# Patient Record
Sex: MALE | Race: WHITE | NOT HISPANIC OR LATINO | Employment: UNEMPLOYED | URBAN - NONMETROPOLITAN AREA
[De-identification: names, ages, dates, MRNs, and addresses within clinical notes are randomized per-mention and may not be internally consistent; named-entity substitution may affect disease eponyms.]

---

## 2018-02-22 ENCOUNTER — HOSPITAL ENCOUNTER (OUTPATIENT)
Dept: MRI IMAGING | Facility: HOSPITAL | Age: 43
Discharge: HOME | End: 2018-02-22
Attending: FAMILY MEDICINE | Admitting: FAMILY MEDICINE
Payer: COMMERCIAL
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
## 2018-02-22 DIAGNOSIS — M25.512 LEFT SHOULDER PAIN: ICD-10-CM

## 2018-02-22 PROCEDURE — 73221 MRI JOINT UPR EXTREM W/O DYE: CPT | Mod: TC,LT

## 2018-02-28 ENCOUNTER — TRANSFERRED RECORDS (OUTPATIENT)
Dept: HEALTH INFORMATION MANAGEMENT | Facility: HOSPITAL | Age: 43
End: 2018-02-28

## 2018-03-19 ENCOUNTER — HOSPITAL ENCOUNTER (EMERGENCY)
Facility: HOSPITAL | Age: 43
Discharge: HOME | End: 2018-03-19
Attending: NURSE PRACTITIONER | Admitting: NURSE PRACTITIONER
Payer: COMMERCIAL

## 2018-03-19 ENCOUNTER — APPOINTMENT (OUTPATIENT)
Dept: GENERAL RADIOLOGY | Facility: HOSPITAL | Age: 43
End: 2018-03-19
Attending: NURSE PRACTITIONER
Payer: COMMERCIAL

## 2018-03-19 VITALS
BODY MASS INDEX: 32.58 KG/M2 | HEART RATE: 94 BPM | HEIGHT: 69 IN | RESPIRATION RATE: 18 BRPM | WEIGHT: 220 LBS | DIASTOLIC BLOOD PRESSURE: 79 MMHG | TEMPERATURE: 97.9 F | SYSTOLIC BLOOD PRESSURE: 128 MMHG | OXYGEN SATURATION: 97 %

## 2018-03-19 DIAGNOSIS — S93.402A SPRAIN OF LEFT ANKLE, UNSPECIFIED LIGAMENT, INITIAL ENCOUNTER: ICD-10-CM

## 2018-03-19 PROCEDURE — 99202 OFFICE O/P NEW SF 15 MIN: CPT | Performed by: NURSE PRACTITIONER

## 2018-03-19 PROCEDURE — G0463 HOSPITAL OUTPT CLINIC VISIT: HCPCS

## 2018-03-19 PROCEDURE — 73610 X-RAY EXAM OF ANKLE: CPT | Mod: TC,LT

## 2018-03-19 RX ORDER — PROPRANOLOL HYDROCHLORIDE 10 MG/1
10 TABLET ORAL 2 TIMES DAILY
COMMUNITY
End: 2018-09-24

## 2018-03-19 ASSESSMENT — ENCOUNTER SYMPTOMS
ACTIVITY CHANGE: 1
PSYCHIATRIC NEGATIVE: 1
APPETITE CHANGE: 0
FEVER: 0
WOUND: 0
DYSURIA: 0
COUGH: 0
WEAKNESS: 0
TROUBLE SWALLOWING: 0
CHILLS: 0
NUMBNESS: 1

## 2018-03-19 NOTE — ED AVS SNAPSHOT
[facility]    [address] [place] [address]    Phone:  [phone]                                       Dick Valdovinos   MRN: [id number]    Department:  [facility]   Date of Visit:  3/19/2018           After Visit Summary Signature Page     I have received my discharge instructions, and my questions have been answered. I have discussed any challenges I see with this plan with the nurse or doctor.    ..........................................................................................................................................  Patient/Patient Representative Signature      ..........................................................................................................................................  Patient Representative Print Name and Relationship to Patient    ..................................................               ................................................  Date                                            Time    ..........................................................................................................................................  Reviewed by Signature/Title    ...................................................              ..............................................  Date                                                            Time

## 2018-03-19 NOTE — ED AVS SNAPSHOT
[facility]    [address] [address]    Phone:  [phone]                                       Dick Valdovinos   MRN: [id number]    Department:  [facility]   Date of Visit:  3/19/2018           Patient Information     Date Of Birth          1975        Your diagnoses for this visit were:     Sprain of left ankle, unspecified ligament, initial encounter        You were seen by Jeni Etienne NP.      Follow-up Information     Follow up with Stanley Amador MD In 10 days.    Specialty:  Family Practice    Why:  For re-evaluation.     Contact information:    [facility]  [address] [address] [phone]          Follow up with [facility].    Specialty:  EMERGENCY MEDICINE    Why:  As needed, If symptoms worsen    Contact information:    [address] [address] [phone]    Additional information:    From [facility]: Take [address]. Turn left at [address]. Turn left at the first stoplight on [address]. At the first stop sign, take a right onto [address]. Take a left into the parking lot and continue through until you reach the North enterance of the building.       From [place]: Take [address]. Take the [address] ramp towards [place]. Turn left onto [address]. Take a slight right onto [address]. Turn left at the first stoplight on [address]. At the first stop sign, take a right onto [address]. Take a left into the parking lot and continue through until you reach the North enterance of the building.       From [place]: Take [address]. Take a right at [address]. At the first stop sign, take a right onto [address]. Take a left into the parking lot and continue through until you reach the North enterance of the building.         Discharge Instructions       Take tylenol and/or ibuprofen for  "pain. Follow dosing on package.   See RICE handout.   Elevate left foot as much as able.   Apply ice to left ankle for 20 minutes every 1-2 hours while awake.   Wear boot to left ankle until pain improves. You can take boot off for rest.   Follow up with PCP in 10 days.   Return to urgent care or emergency department with any increase in symptoms or concerns.         Discharge References/Attachments     SPRAIN, ANKLE (ADULT) (ENGLISH)    SPRAINS AND FRACTURES: FIRST AID (ENGLISH)    STRAINS AND SPRAINS, SELF-CARE FOR (ENGLISH)    STRAINS AND SPRAINS, TREATING (ENGLISH)    RICE (ENGLISH)         Review of your medicines      Our records show that you are taking the medicines listed below. If these are incorrect, please call your family doctor or clinic.        Dose / Directions Last dose taken    BUSPAR PO        Refills:  0        LAMICTAL PO   Dose:  100 mg        Take 100 mg by mouth 2 times daily   Refills:  0        propranolol 10 MG tablet   Commonly known as:  INDERAL        Take by mouth 3 times daily   Refills:  0                Procedures and tests performed during your visit     Ankle XR, G/E 3 views, left      Orders Needing Specimen Collection     None      Pending Results     No orders found from 3/17/2018 to 3/20/2018.            Pending Culture Results     No orders found from 3/17/2018 to 3/20/2018.            Thank you for choosing [facility]       Thank you for choosing [facility] for your care. Our goal is always to provide you with excellent care. Hearing back from our patients is one way we can continue to improve our services. Please take a few minutes to complete the written survey that you may receive in the mail after you visit with us. Thank you!        [email or web address] Information     [employer] lets you send messages to your doctor, view your test results, renew your prescriptions, schedule appointments and more. To sign up, go to [email or web address] . Click on \"Log in\" on the left side of the " "screen, which will take you to the Welcome page. Then click on \"Sign up Now\" on the right side of the page.     You will be asked to enter the access code listed below, as well as some personal information. Please follow the directions to create your username and password.     Your access code is: [email or web address]  Expires: 2018  2:43 PM     Your access code will  in 90 days. If you need help or a new code, please call your [facility] clinic or [phone].        Care EveryWhere ID     This is your Care EveryWhere ID. This could be used by other organizations to access your [facility] medical records  [id number]        Equal Access to Services     GEETHA FIGUEREDO : Reina Salcido, malik clinton, jarad gupta, niki solis. So [facility] [phone].    ATENCIÓN: Si habla español, tiene a baxter disposición servicios gratuitos de asistencia lingüística. Llame al [phone].    We comply with applicable federal civil rights laws and [place] laws. We do not discriminate on the basis of race, color, national origin, age, disability, sex, sexual orientation, or gender identity.            After Visit Summary       This is your record. Keep this with you and show to your community pharmacist(s) and doctor(s) at your next visit.                  "

## 2018-03-19 NOTE — DISCHARGE INSTRUCTIONS
Take tylenol and/or ibuprofen for pain. Follow dosing on package.   See RICE handout.   Elevate left foot as much as able.   Apply ice to left ankle for 20 minutes every 1-2 hours while awake.   Wear boot to left ankle until pain improves. You can take boot off for rest.   Follow up with PCP in 10 days.   Return to urgent care or emergency department with any increase in symptoms or concerns.

## 2018-03-19 NOTE — ED PROVIDER NOTES
"  History     Chief Complaint   Patient presents with     Ankle Trauma     Left ankle injury last night, CMS intact with strong palpable pulses     The history is provided by the patient. No  was used.     Dick Valdovinos is a 42 year old male who presents with left ankle pain that started 1 day ago. He was running to his car yesterday when he slipped on ice and fell. Denies hitting his head. Denies any other injuries. He's taken ibuprofen with mild effectiveness. Eating and drinking well. Bowel and bladder are working well. He is unable to bear weight on his left ankle. Previous left ankle sprain.       Problem List:    There are no active problems to display for this patient.       Past Medical History:    History reviewed. No pertinent past medical history.    Past Surgical History:    History reviewed. No pertinent surgical history.    Family History:    No family history on file.    Social History:  Marital Status:  Single [1]  Social History   Substance Use Topics     Smoking status: Light Tobacco Smoker     Smokeless tobacco: Never Used     Alcohol use Not on file        Medications:      BusPIRone HCl (BUSPAR PO)   propranolol (INDERAL) 10 MG tablet   LamoTRIgine (LAMICTAL PO)         Review of Systems   Constitutional: Positive for activity change. Negative for appetite change, chills and fever.   HENT: Negative for trouble swallowing.    Respiratory: Negative for cough.    Genitourinary: Negative for dysuria.   Musculoskeletal:        Left ankle pain.   Skin: Negative for rash and wound.   Neurological: Positive for numbness. Negative for weakness.        Some numbness and tingling in left ankle/foot.    Psychiatric/Behavioral: Negative.        Physical Exam   BP: 128/79  Pulse: 94  Temp: 97.9  F (36.6  C)  Resp: 18  Height: 175.3 cm (5' 9\")  Weight: 99.8 kg (220 lb)  SpO2: 97 %      Physical Exam   Constitutional: He is oriented to person, place, and time. He appears well-developed " and well-nourished. No distress.   HENT:   Head: Normocephalic.   Mouth/Throat: Oropharynx is clear and moist.   Neck: Normal range of motion. Neck supple.   Cardiovascular: Normal rate, regular rhythm, normal heart sounds and intact distal pulses.    No murmur heard.  Pulmonary/Chest: Effort normal. No respiratory distress. He has no wheezes. He has no rales.   Abdominal: Soft. He exhibits no distension.   Musculoskeletal: He exhibits edema and tenderness. He exhibits no deformity.        Feet:    CMS and ROM intact to left lower extremity. Limited ROM from pain. Left dorsalis pedis +2. Tenderness and swelling to left lateral malleolus.    Neurological: He is alert and oriented to person, place, and time. He exhibits normal muscle tone.   Skin: Skin is warm and dry. No rash noted. He is not diaphoretic. No erythema.   Psychiatric: He has a normal mood and affect. His behavior is normal.   Nursing note and vitals reviewed.      ED Course     ED Course     Procedures    I personally reviewed the x-rays and there is NO fracture or dislocation. Radiology review pending and nurse will notify patient if there is any change in the treatment plan.    Results for orders placed or performed during the hospital encounter of 03/19/18   Ankle XR, G/E 3 views, left    Narrative    PROCEDURE: XR ANKLE LT G/E 3 VW 3/19/2018 2:20 PM    HISTORY: Pain.;     COMPARISONS: None.    TECHNIQUE: 3 views.    FINDINGS: No fracture or dislocation is seen. Ankle mortise appears  congruent.    There is prominent lateral soft tissue swelling.         Impression    IMPRESSION: Prominent lateral soft tissue swelling.    INEZ LEVY MD     Deferred Toradol injection.     Assessments & Plan (with Medical Decision Making)     Discussed plan of care. He verbalized understanding. All questions answered.     I have reviewed the nursing notes.    I have reviewed the findings, diagnosis, plan and need for follow up with the patient.  Discharged in  stable condition.     Discharge Medication List as of 3/19/2018  2:44 PM          Final diagnoses:   Sprain of left ankle, unspecified ligament, initial encounter     Take tylenol and/or ibuprofen for pain. Follow dosing on package.   See RICE handout.   Elevate left foot as much as able.   Apply ice to left ankle for 20 minutes every 1-2 hours while awake.   Wear boot to left ankle until pain improves. You can take boot off for rest.   Follow up with PCP in 10 days.   Return to urgent care or emergency department with any increase in symptoms or concerns.     FERMIN Merino  3/19/2018  2:09 PM  [facility]       Jeni Etienne NP  03/19/18 [id number]

## 2018-04-11 ENCOUNTER — TRANSFERRED RECORDS (OUTPATIENT)
Dept: HEALTH INFORMATION MANAGEMENT | Facility: HOSPITAL | Age: 43
End: 2018-04-11

## 2018-04-13 RX ORDER — ALBUTEROL SULFATE 90 UG/1
2 AEROSOL, METERED RESPIRATORY (INHALATION) EVERY 4 HOURS PRN
COMMUNITY
End: 2021-06-20

## 2018-04-16 ENCOUNTER — ANESTHESIA EVENT (OUTPATIENT)
Dept: SURGERY | Facility: HOSPITAL | Age: 43
End: 2018-04-16
Payer: COMMERCIAL

## 2018-04-16 ENCOUNTER — HOSPITAL ENCOUNTER (OUTPATIENT)
Dept: MRI IMAGING | Facility: HOSPITAL | Age: 43
Discharge: HOME | End: 2018-04-16
Attending: FAMILY MEDICINE | Admitting: FAMILY MEDICINE
Payer: COMMERCIAL

## 2018-04-16 DIAGNOSIS — S49.91XA RIGHT SHOULDER INJURY: ICD-10-CM

## 2018-04-16 PROCEDURE — 73221 MRI JOINT UPR EXTREM W/O DYE: CPT | Mod: TC,RT

## 2018-04-16 ASSESSMENT — LIFESTYLE VARIABLES: TOBACCO_USE: 1

## 2018-04-16 NOTE — ANESTHESIA PREPROCEDURE EVALUATION
Anesthesia Evaluation     . Pt has had prior anesthetic.     No history of anesthetic complications          ROS/MED HX    ENT/Pulmonary:     (+)tobacco use, Current use <0.5 PPD packs/day  asthma , . .    Neurologic:  - neg neurologic ROS     Cardiovascular:  - neg cardiovascular ROS       METS/Exercise Tolerance:     Hematologic:  - neg hematologic  ROS       Musculoskeletal:   (+) , , other musculoskeletal- LEFT SHOULDER ROTATOR CUFF TEAR      GI/Hepatic:     (+) GERD       Renal/Genitourinary:  - ROS Renal section negative       Endo:     (+) Obesity, .      Psychiatric:     (+) psychiatric history depression      Infectious Disease:   (+) MRSA,       Malignancy:      - no malignancy   Other:    - neg other ROS                 Physical Exam  Normal systems: dental    Airway   Mallampati: I  TM distance: >3 FB  Neck ROM: full    Dental     Cardiovascular   Rhythm and rate: regular and normal      Pulmonary    breath sounds clear to auscultation                    Anesthesia Plan      History & Physical Review  History and physical reviewed and following examination; no interval change.    ASA Status:  2 .    NPO Status:  > 8 hours    Plan for General and ETT with Intravenous and Propofol induction. Maintenance will be Balanced.    PONV prophylaxis:  Ondansetron (or other 5HT-3), Scopolamine patch and Dexamethasone or Solumedrol  Patient is extremely nervous and refuses preop placement of block. Will consider in PACU after procedure if absolutely needed.       Postoperative Care  Postoperative pain management:  IV analgesics and Oral pain medications.      Consents  Anesthetic plan, risks, benefits and alternatives discussed with:  Patient..                          .

## 2018-04-17 ENCOUNTER — HOSPITAL ENCOUNTER (OUTPATIENT)
Facility: HOSPITAL | Age: 43
Discharge: HOME | End: 2018-04-17
Attending: ORTHOPAEDIC SURGERY | Admitting: ORTHOPAEDIC SURGERY
Payer: COMMERCIAL

## 2018-04-17 ENCOUNTER — ANESTHESIA (OUTPATIENT)
Dept: SURGERY | Facility: HOSPITAL | Age: 43
End: 2018-04-17
Payer: COMMERCIAL

## 2018-04-17 VITALS
WEIGHT: 201.4 LBS | OXYGEN SATURATION: 94 % | BODY MASS INDEX: 29.83 KG/M2 | RESPIRATION RATE: 18 BRPM | HEIGHT: 69 IN | TEMPERATURE: 97.7 F | SYSTOLIC BLOOD PRESSURE: 157 MMHG | DIASTOLIC BLOOD PRESSURE: 63 MMHG

## 2018-04-17 DIAGNOSIS — M75.122 COMPLETE TEAR OF LEFT ROTATOR CUFF: Primary | ICD-10-CM

## 2018-04-17 PROCEDURE — 25000128 H RX IP 250 OP 636: Performed by: ORTHOPAEDIC SURGERY

## 2018-04-17 PROCEDURE — 37000008 ZZH ANESTHESIA TECHNICAL FEE, 1ST 30 MIN: Performed by: ORTHOPAEDIC SURGERY

## 2018-04-17 PROCEDURE — C1713 ANCHOR/SCREW BN/BN,TIS/BN: HCPCS | Performed by: ORTHOPAEDIC SURGERY

## 2018-04-17 PROCEDURE — 36000093 ZZH SURGERY LEVEL 4 1ST 30 MIN: Performed by: ORTHOPAEDIC SURGERY

## 2018-04-17 PROCEDURE — 01999 UNLISTED ANES PROCEDURE: CPT | Performed by: NURSE ANESTHETIST, CERTIFIED REGISTERED

## 2018-04-17 PROCEDURE — 25000566 ZZH SEVOFLURANE, EA 15 MIN: Performed by: ANESTHESIOLOGY

## 2018-04-17 PROCEDURE — 40000306 ZZH STATISTIC PRE PROC ASSESS II: Performed by: ORTHOPAEDIC SURGERY

## 2018-04-17 PROCEDURE — 25000128 H RX IP 250 OP 636: Performed by: ANESTHESIOLOGY

## 2018-04-17 PROCEDURE — 27110028 ZZH OR GENERAL SUPPLY NON-STERILE: Performed by: ORTHOPAEDIC SURGERY

## 2018-04-17 PROCEDURE — 25000132 ZZH RX MED GY IP 250 OP 250 PS 637: Performed by: ANESTHESIOLOGY

## 2018-04-17 PROCEDURE — 71000027 ZZH RECOVERY PHASE 2 EACH 15 MINS: Performed by: ORTHOPAEDIC SURGERY

## 2018-04-17 PROCEDURE — 27210995 ZZH RX 272: Performed by: ORTHOPAEDIC SURGERY

## 2018-04-17 PROCEDURE — 37000009 ZZH ANESTHESIA TECHNICAL FEE, EACH ADDTL 15 MIN: Performed by: ORTHOPAEDIC SURGERY

## 2018-04-17 PROCEDURE — 25000125 ZZHC RX 250: Performed by: NURSE ANESTHETIST, CERTIFIED REGISTERED

## 2018-04-17 PROCEDURE — 25000128 H RX IP 250 OP 636: Performed by: NURSE ANESTHETIST, CERTIFIED REGISTERED

## 2018-04-17 PROCEDURE — 25000125 ZZHC RX 250

## 2018-04-17 PROCEDURE — 27210794 ZZH OR GENERAL SUPPLY STERILE: Performed by: ORTHOPAEDIC SURGERY

## 2018-04-17 PROCEDURE — 25000125 ZZHC RX 250: Performed by: ORTHOPAEDIC SURGERY

## 2018-04-17 PROCEDURE — 29827 SHO ARTHRS SRG RT8TR CUF RPR: CPT | Performed by: ANESTHESIOLOGY

## 2018-04-17 PROCEDURE — 71000014 ZZH RECOVERY PHASE 1 LEVEL 2 FIRST HR: Performed by: ORTHOPAEDIC SURGERY

## 2018-04-17 PROCEDURE — 36000063 ZZH SURGERY LEVEL 4 EA 15 ADDTL MIN: Performed by: ORTHOPAEDIC SURGERY

## 2018-04-17 DEVICE — ANCHOR-4.75MM BIOCOMPOSITE SWIVELOCK DOUBLE LOADED: Type: IMPLANTABLE DEVICE | Site: SHOULDER | Status: FUNCTIONAL

## 2018-04-17 RX ORDER — LABETALOL HYDROCHLORIDE 5 MG/ML
10 INJECTION, SOLUTION INTRAVENOUS
Status: DISCONTINUED | OUTPATIENT
Start: 2018-04-17 | End: 2018-04-17 | Stop reason: HOSPADM

## 2018-04-17 RX ORDER — BUPIVACAINE HYDROCHLORIDE AND EPINEPHRINE 5; 5 MG/ML; UG/ML
INJECTION, SOLUTION PERINEURAL
Status: DISCONTINUED
Start: 2018-04-17 | End: 2018-04-17 | Stop reason: HOSPADM

## 2018-04-17 RX ORDER — HYDRALAZINE HYDROCHLORIDE 20 MG/ML
2.5-5 INJECTION INTRAMUSCULAR; INTRAVENOUS EVERY 10 MIN PRN
Status: DISCONTINUED | OUTPATIENT
Start: 2018-04-17 | End: 2018-04-17 | Stop reason: HOSPADM

## 2018-04-17 RX ORDER — HYDROMORPHONE HYDROCHLORIDE 1 MG/ML
.3-.5 INJECTION, SOLUTION INTRAMUSCULAR; INTRAVENOUS; SUBCUTANEOUS EVERY 10 MIN PRN
Status: DISCONTINUED | OUTPATIENT
Start: 2018-04-17 | End: 2018-04-17 | Stop reason: HOSPADM

## 2018-04-17 RX ORDER — ALBUTEROL SULFATE 0.83 MG/ML
2.5 SOLUTION RESPIRATORY (INHALATION) EVERY 4 HOURS PRN
Status: DISCONTINUED | OUTPATIENT
Start: 2018-04-17 | End: 2018-04-17 | Stop reason: HOSPADM

## 2018-04-17 RX ORDER — CEFAZOLIN SODIUM 2 G/100ML
2 INJECTION, SOLUTION INTRAVENOUS
Status: COMPLETED | OUTPATIENT
Start: 2018-04-17 | End: 2018-04-17

## 2018-04-17 RX ORDER — PROPOFOL 10 MG/ML
INJECTION, EMULSION INTRAVENOUS PRN
Status: DISCONTINUED | OUTPATIENT
Start: 2018-04-17 | End: 2018-04-17

## 2018-04-17 RX ORDER — ONDANSETRON 2 MG/ML
INJECTION INTRAMUSCULAR; INTRAVENOUS PRN
Status: DISCONTINUED | OUTPATIENT
Start: 2018-04-17 | End: 2018-04-17

## 2018-04-17 RX ORDER — FENTANYL CITRATE 50 UG/ML
INJECTION, SOLUTION INTRAMUSCULAR; INTRAVENOUS PRN
Status: DISCONTINUED | OUTPATIENT
Start: 2018-04-17 | End: 2018-04-17

## 2018-04-17 RX ORDER — KETOROLAC TROMETHAMINE 30 MG/ML
30 INJECTION, SOLUTION INTRAMUSCULAR; INTRAVENOUS EVERY 6 HOURS PRN
Status: DISCONTINUED | OUTPATIENT
Start: 2018-04-17 | End: 2018-04-17 | Stop reason: HOSPADM

## 2018-04-17 RX ORDER — PHENYLEPHRINE HCL IN 0.9% NACL 1 MG/10 ML
SYRINGE (ML) INTRAVENOUS PRN
Status: DISCONTINUED | OUTPATIENT
Start: 2018-04-17 | End: 2018-04-17

## 2018-04-17 RX ORDER — SCOLOPAMINE TRANSDERMAL SYSTEM 1 MG/1
1 PATCH, EXTENDED RELEASE TRANSDERMAL ONCE
Status: COMPLETED | OUTPATIENT
Start: 2018-04-17 | End: 2018-04-17

## 2018-04-17 RX ORDER — OXYCODONE HYDROCHLORIDE 5 MG/1
5 TABLET ORAL EVERY 4 HOURS PRN
Status: DISCONTINUED | OUTPATIENT
Start: 2018-04-17 | End: 2018-04-17 | Stop reason: HOSPADM

## 2018-04-17 RX ORDER — FENTANYL CITRATE 50 UG/ML
25-50 INJECTION, SOLUTION INTRAMUSCULAR; INTRAVENOUS
Status: DISCONTINUED | OUTPATIENT
Start: 2018-04-17 | End: 2018-04-17 | Stop reason: HOSPADM

## 2018-04-17 RX ORDER — DEXAMETHASONE SODIUM PHOSPHATE 10 MG/ML
INJECTION, SOLUTION INTRAMUSCULAR; INTRAVENOUS PRN
Status: DISCONTINUED | OUTPATIENT
Start: 2018-04-17 | End: 2018-04-17

## 2018-04-17 RX ORDER — MEPERIDINE HYDROCHLORIDE 25 MG/ML
12.5 INJECTION INTRAMUSCULAR; INTRAVENOUS; SUBCUTANEOUS
Status: DISCONTINUED | OUTPATIENT
Start: 2018-04-17 | End: 2018-04-17 | Stop reason: HOSPADM

## 2018-04-17 RX ORDER — OXYCODONE HYDROCHLORIDE 5 MG/1
10 TABLET ORAL
Status: DISCONTINUED | OUTPATIENT
Start: 2018-04-17 | End: 2018-04-17 | Stop reason: HOSPADM

## 2018-04-17 RX ORDER — BUPIVACAINE HYDROCHLORIDE AND EPINEPHRINE 5; 5 MG/ML; UG/ML
INJECTION, SOLUTION EPIDURAL; INTRACAUDAL; PERINEURAL PRN
Status: DISCONTINUED | OUTPATIENT
Start: 2018-04-17 | End: 2018-04-17 | Stop reason: HOSPADM

## 2018-04-17 RX ORDER — SODIUM CHLORIDE, SODIUM LACTATE, POTASSIUM CHLORIDE, CALCIUM CHLORIDE 600; 310; 30; 20 MG/100ML; MG/100ML; MG/100ML; MG/100ML
INJECTION, SOLUTION INTRAVENOUS CONTINUOUS
Status: DISCONTINUED | OUTPATIENT
Start: 2018-04-17 | End: 2018-04-17 | Stop reason: HOSPADM

## 2018-04-17 RX ORDER — NALOXONE HYDROCHLORIDE 0.4 MG/ML
.1-.4 INJECTION, SOLUTION INTRAMUSCULAR; INTRAVENOUS; SUBCUTANEOUS
Status: DISCONTINUED | OUTPATIENT
Start: 2018-04-17 | End: 2018-04-17 | Stop reason: HOSPADM

## 2018-04-17 RX ORDER — EPHEDRINE SULFATE 50 MG/ML
INJECTION, SOLUTION INTRAMUSCULAR; INTRAVENOUS; SUBCUTANEOUS PRN
Status: DISCONTINUED | OUTPATIENT
Start: 2018-04-17 | End: 2018-04-17

## 2018-04-17 RX ORDER — DEXAMETHASONE SODIUM PHOSPHATE 4 MG/ML
4 INJECTION, SOLUTION INTRA-ARTICULAR; INTRALESIONAL; INTRAMUSCULAR; INTRAVENOUS; SOFT TISSUE EVERY 10 MIN PRN
Status: DISCONTINUED | OUTPATIENT
Start: 2018-04-17 | End: 2018-04-17 | Stop reason: HOSPADM

## 2018-04-17 RX ORDER — TRIAMCINOLONE ACETONIDE 40 MG/ML
INJECTION, SUSPENSION INTRA-ARTICULAR; INTRAMUSCULAR
Status: DISCONTINUED
Start: 2018-04-17 | End: 2018-04-17 | Stop reason: HOSPADM

## 2018-04-17 RX ORDER — ONDANSETRON 4 MG/1
4 TABLET, ORALLY DISINTEGRATING ORAL EVERY 30 MIN PRN
Status: DISCONTINUED | OUTPATIENT
Start: 2018-04-17 | End: 2018-04-17 | Stop reason: HOSPADM

## 2018-04-17 RX ORDER — PROMETHAZINE HYDROCHLORIDE 25 MG/ML
12.5 INJECTION, SOLUTION INTRAMUSCULAR; INTRAVENOUS
Status: DISCONTINUED | OUTPATIENT
Start: 2018-04-17 | End: 2018-04-17 | Stop reason: HOSPADM

## 2018-04-17 RX ORDER — EPINEPHRINE 1 MG/ML
INJECTION, SOLUTION INTRAMUSCULAR; SUBCUTANEOUS
Status: DISCONTINUED
Start: 2018-04-17 | End: 2018-04-17 | Stop reason: HOSPADM

## 2018-04-17 RX ORDER — SCOLOPAMINE TRANSDERMAL SYSTEM 1 MG/1
PATCH, EXTENDED RELEASE TRANSDERMAL
Status: COMPLETED
Start: 2018-04-17 | End: 2018-04-17

## 2018-04-17 RX ORDER — OXYCODONE HYDROCHLORIDE 5 MG/1
5-10 TABLET ORAL
Qty: 60 TABLET | Refills: 0 | Status: SHIPPED | OUTPATIENT
Start: 2018-04-17 | End: 2018-06-17

## 2018-04-17 RX ORDER — ONDANSETRON 2 MG/ML
4 INJECTION INTRAMUSCULAR; INTRAVENOUS EVERY 30 MIN PRN
Status: DISCONTINUED | OUTPATIENT
Start: 2018-04-17 | End: 2018-04-17 | Stop reason: HOSPADM

## 2018-04-17 RX ORDER — LIDOCAINE HYDROCHLORIDE 20 MG/ML
INJECTION, SOLUTION INFILTRATION; PERINEURAL PRN
Status: DISCONTINUED | OUTPATIENT
Start: 2018-04-17 | End: 2018-04-17

## 2018-04-17 RX ADMIN — PROPOFOL 200 MG: 10 INJECTION, EMULSION INTRAVENOUS at 09:57

## 2018-04-17 RX ADMIN — Medication 100 MG: at 09:58

## 2018-04-17 RX ADMIN — Medication 100 MCG: at 10:33

## 2018-04-17 RX ADMIN — SODIUM CHLORIDE, POTASSIUM CHLORIDE, SODIUM LACTATE AND CALCIUM CHLORIDE: 600; 310; 30; 20 INJECTION, SOLUTION INTRAVENOUS at 08:33

## 2018-04-17 RX ADMIN — CEFAZOLIN SODIUM 2 G: 2 INJECTION, SOLUTION INTRAVENOUS at 09:51

## 2018-04-17 RX ADMIN — Medication 100 MCG: at 10:38

## 2018-04-17 RX ADMIN — Medication 5 MG: at 10:24

## 2018-04-17 RX ADMIN — FENTANYL CITRATE 50 MCG: 50 INJECTION INTRAMUSCULAR; INTRAVENOUS at 11:40

## 2018-04-17 RX ADMIN — SCOLOPAMINE TRANSDERMAL SYSTEM 1 PATCH: 1 PATCH, EXTENDED RELEASE TRANSDERMAL at 08:22

## 2018-04-17 RX ADMIN — FENTANYL CITRATE 100 MCG: 50 INJECTION, SOLUTION INTRAMUSCULAR; INTRAVENOUS at 09:49

## 2018-04-17 RX ADMIN — DEXAMETHASONE SODIUM PHOSPHATE 10 MG: 10 INJECTION, SOLUTION INTRAMUSCULAR; INTRAVENOUS at 10:12

## 2018-04-17 RX ADMIN — FENTANYL CITRATE 100 MCG: 50 INJECTION, SOLUTION INTRAMUSCULAR; INTRAVENOUS at 09:54

## 2018-04-17 RX ADMIN — ONDANSETRON 4 MG: 2 INJECTION INTRAMUSCULAR; INTRAVENOUS at 11:04

## 2018-04-17 RX ADMIN — SODIUM CHLORIDE, POTASSIUM CHLORIDE, SODIUM LACTATE AND CALCIUM CHLORIDE: 600; 310; 30; 20 INJECTION, SOLUTION INTRAVENOUS at 08:22

## 2018-04-17 RX ADMIN — Medication 100 MCG: at 10:41

## 2018-04-17 RX ADMIN — Medication 5 MG: at 10:15

## 2018-04-17 RX ADMIN — HYDROMORPHONE HYDROCHLORIDE 0.5 MG: 1 INJECTION, SOLUTION INTRAMUSCULAR; INTRAVENOUS; SUBCUTANEOUS at 12:03

## 2018-04-17 RX ADMIN — FENTANYL CITRATE 50 MCG: 50 INJECTION INTRAMUSCULAR; INTRAVENOUS at 11:58

## 2018-04-17 RX ADMIN — OXYCODONE HYDROCHLORIDE 5 MG: 5 TABLET ORAL at 12:39

## 2018-04-17 RX ADMIN — MIDAZOLAM 2 MG: 1 INJECTION INTRAMUSCULAR; INTRAVENOUS at 09:49

## 2018-04-17 RX ADMIN — SCOPALAMINE 1 PATCH: 1 PATCH, EXTENDED RELEASE TRANSDERMAL at 08:22

## 2018-04-17 RX ADMIN — FENTANYL CITRATE 50 MCG: 50 INJECTION INTRAMUSCULAR; INTRAVENOUS at 11:53

## 2018-04-17 RX ADMIN — Medication 5 MG: at 10:10

## 2018-04-17 RX ADMIN — Medication 10 MG: at 10:03

## 2018-04-17 RX ADMIN — FENTANYL CITRATE 50 MCG: 50 INJECTION INTRAMUSCULAR; INTRAVENOUS at 11:47

## 2018-04-17 RX ADMIN — LIDOCAINE HYDROCHLORIDE 40 MG: 20 INJECTION, SOLUTION INFILTRATION; PERINEURAL at 09:56

## 2018-04-17 NOTE — DISCHARGE INSTRUCTIONS
Post-Anesthesia Patient Instructions    IMMEDIATELY FOLLOWING SURGERY:  Do not drive or operate machinery for the first twenty four hours after surgery.  Do not make any important decisions for twenty four hours after surgery or while taking narcotic pain medications or sedatives.  If you develop intractable nausea and vomiting or a severe headache please notify your doctor immediately.    FOLLOW-UP:  FOLLOW-UP WITH DR HARP AT THE [facility] April 24TH, 2018 AT 10:45A.M.    WOUND CARE INSTRUCTIONS (if applicable):  Keep a dry clean dressing on the anesthesia/puncture wound site if there is drainage.  Once the wound has quit draining you may leave it open to air.  Generally you should leave the bandage intact for twenty four hours unless there is drainage.  If the epidural site drains for more than 36-48 hours please call the anesthesia department.    QUESTIONS?:  Please feel free to call your physician or the hospital  if you have any questions, and they will be happy to assist you.         Scopolamine Patch  Remove the scopolamine patch behind your ear after 24 hours after application.   After removing the patch, wash your hands and the area behind your ear thoroughly with soap and water.   The patch will still contain some medicine after use.   To avoid accidental contact or ingestion by children or pets, fold the used patch in half with the sticky side together and throw away in the trash out of the reach of children and pets.

## 2018-04-17 NOTE — OP NOTE
Procedure Date: 04/17/2018      PREOPERATIVE DIAGNOSIS:  Left shoulder full-thickness supraspinatus tendon tear.      POSTOPERATIVE DIAGNOSES:   1.  Left shoulder full-thickness supraspinatus tendon tear measuring 1.5 cm anterior to posterior dimension.     2.  Extensive subacromial bursitis.   3.  Degenerative anterior and superior labral fraying along with some fraying of long head of the biceps.      PROCEDURES:   1.  Left shoulder arthroscopy with arthroscopic rotator cuff repair involving the supraspinatus.   2.  Left shoulder arthroscopy with near total bursectomy and decompression.   3.  Left shoulder extensive intra-articular debridement and debridement of degenerative labrum, synovium and tendinopathy of long head of biceps.      SURGEON:  Amanuel Matthews MD      ASSISTANT:  TIMOTEO Morales        FINDINGS:   1.  Complete tear, supraspinatus tendon measuring 1.5 cm, anterior to posterior dimension, mid aspect of the tendon.   2.  Satisfactory repair.   3.  Mild long head of biceps tendinopathy.   4.  No glenohumeral chondromalacia.   5.  Intact subscapularis, infraspinatus, teres minor.      IMPLANTS:  One 4.75 mm SwiveLock.      SPECIMENS:  None.      DRAINS:  None.      COMPLICATIONS:  None.      ESTIMATED BLOOD LOSS:  Less than 10 mL      INDICATIONS:  The patient is a 42-year-old male who had a fall recently while carrying a bucket of ice.  He landed on his left shoulder.  He had an MRI show a high-grade partial to near full-thickness supraspinatus tendon tear without retraction.  He had pain and given his young age and activity level he wanted to proceed with left shoulder arthroscopy and rotator cuff repair.  I discussed the procedure and risks of this with him, obtained preoperative clearance from primary care physician.      DESCRIPTION OF PROCEDURE:  The patient was seen in the preoperative area.  Surgical site was marked.  Questions were answered.  He was taken to the Operating Room, placed  under general anesthesia, placed in beach chair position.  Head was placed neutral.  Left upper extremity was prepped twice with ChloraPrep and draped in sterile fashion.  A timeout was called to identify correct patient and correct surgical site.  I established a posterior portal.  The anterior portal was established in the rotator interval under direct visualization.  Diagnostic arthroscopy revealed no chondromalacia to the humeral head or glenoid.  His labrum has degenerative anterior fraying.  There was also some synovitis along the undersurface of the rotator cuff that was debrided with a shaver and electrocautery.  He had a couple strands of tendinopathy of the long head of biceps but the anchor was in place.  Posterior labrum and anterior labrum were intact.  No loose bodies.  I entered the subacromial space where a near total bursectomy was completed.  I also ablated the CA ligament.  The bursal side of the rotator cuff had a high-grade to full-thickness supraspinatus tendon tear at the mid portion of the tendon measuring about 1.5 cm in anterior posterior dimension.  I repaired the tuberosity with a bur and microfractured it.  I then placed a SwiveLock at the lateral portion.  I placed a horizontal mattress suture and brought this down in SpeedBridge technique.  I did have a small flap of tissue posteriorly that I secured with a single strand repair and arthroscopic knot.  Final pictures were taken.  The sutures were trimmed.  Wounds were closed in standard fashion with 3-0 nylon.  Sterile dressing was applied.  He was awakened, extubated and transferred to PACU in stable condition.      POSTOPERATIVE PLAN:  The patient will be in a sling at all times.  Elbow, wrist and hand range of motion as tolerated.  I will see him in a week for wound check, suture removal and will initiate therapy about 3-4 weeks post-op.  I gave him oxycodone for pain control. He will call my office with any questions or concerns.          MANNY HARP MD             D: 2018   T: 2018   MT: MARIE      Name:     BOSTON GARCIA   MRN:      [id number]        Account:        [id number]   :      1975           Procedure Date: 2018      Document: [id number]

## 2018-04-17 NOTE — OR NURSING
Pateint discharged to \[facility]\"".  Axel score 9/10. Pain level 3/10.  Discharged from unit via cart.  Hand off report given to \[facility]\"" rn

## 2018-04-17 NOTE — OR NURSING
Patient and responsible adult given discharge instructions with no questions regarding instructions. Axel score 19. Pain level 3/10.  Discharged from unit via wheel chair. Patient discharged to home.

## 2018-04-17 NOTE — IP AVS SNAPSHOT
MRN:[id number]                      After Visit Summary   4/17/2018    Dick Valdovinos    MRN: [id number]           Thank you!     Thank you for choosing [facility] for your care. Our goal is always to provide you with excellent care. Hearing back from our patients is one way we can continue to improve our services. Please take a few minutes to complete the written survey that you may receive in the mail after you visit with us. Thank you!        Patient Information     Date Of Birth          1975        About your hospital stay     You were admitted on:  April 17, 2018 You last received care in the:  [facility]    You were discharged on:  April 17, 2018       Who to Call     For medical emergencies, please call 911.  For non-urgent questions about your medical care, please call your primary care provider or clinic, [phone]  For questions related to your surgery, please call your surgery clinic        Attending Provider     Provider Specialty    Amanuel Matthews MD Orthopedics       Primary Care Provider Office Phone # Fax #    Stanley Amador [phone]      After Care Instructions     Diet Instructions       Resume pre-procedure diet            Discharge Instructions       Patient to follow up with appointment in 1 weeks            Do not - immerse incision in water until sutures removed       Do not immerse incision in water until sutures removed            Dressing       Keep dressing clean and dry.  Dressing / incisional care as instructed by RN and or Surgeon  Remove POD #3            Ice to affected area       Ice to operative site PRN            No lifting        Sling at all times  Elbow and Wrist ROM as tolerated            Shower       No shower for 24 hours post procedure. May shower Postoperative Day (POD)  3                  Further instructions from your care team           Post-Anesthesia Patient Instructions    IMMEDIATELY FOLLOWING SURGERY:  Do  "not drive or operate machinery for the first twenty four hours after surgery.  Do not make any important decisions for twenty four hours after surgery or while taking narcotic pain medications or sedatives.  If you develop intractable nausea and vomiting or a severe headache please notify your doctor immediately.    FOLLOW-UP:  FOLLOW-UP WITH DR MATTHEWS AT THE [facility] April 24TH, 2018 AT 10:45A.M.    WOUND CARE INSTRUCTIONS (if applicable):  Keep a dry clean dressing on the anesthesia/puncture wound site if there is drainage.  Once the wound has quit draining you may leave it open to air.  Generally you should leave the bandage intact for twenty four hours unless there is drainage.  If the epidural site drains for more than 36-48 hours please call the anesthesia department.    QUESTIONS?:  Please feel free to call your physician or the hospital  if you have any questions, and they will be happy to assist you.         Scopolamine Patch  Remove the scopolamine patch behind your ear after 24 hours after application.   After removing the patch, wash your hands and the area behind your ear thoroughly with soap and water.   The patch will still contain some medicine after use.   To avoid accidental contact or ingestion by children or pets, fold the used patch in half with the sticky side together and throw away in the trash out of the reach of children and pets.         Pending Results     No orders found from 4/15/2018 to 4/18/2018.            Admission Information     Date & Time Provider Department Dept. Phone    4/17/2018 Amanuel Matthews MD [facility] Preop/Phase [phone]      Your Vitals Were     Blood Pressure Temperature Respirations Height Weight Pulse Oximetry    121/66 97.7  F (36.5  C) (Oral) 19 1.753 m (5' 9\") 91.4 kg (201 lb 6.4 oz) 93%    BMI (Body Mass Index)                   29.74 kg/m2           [employer] Information     [employer] lets you send messages to your doctor, view your test " "results, renew your prescriptions, schedule appointments and more. To sign up, go to [email or web address] . Click on \"Log in\" on the left side of the screen, which will take you to the Welcome page. Then click on \"Sign up Now\" on the right side of the page.     You will be asked to enter the access code listed below, as well as some personal information. Please follow the directions to create your username and password.     Your access code is: [email or web address]  Expires: 2018  2:43 PM     Your access code will  in 90 days. If you need help or a new code, please call your [facility] clinic or [phone].        Care EveryWhere ID     This is your Care EveryWhere ID. This could be used by other organizations to access your [facility] medical records  [id number]        Equal Access to Services     GEETHA FIGUEREDO : Reina Salcido, malik clinton, jarad gupta, niki leone . So [facility] [phone].    ATENCIÓN: Si habla español, tiene a baxter disposición servicios gratuitos de asistencia lingüística. Diego al [phone].    We comply with applicable federal civil rights laws and [place] laws. We do not discriminate on the basis of race, color, national origin, age, disability, sex, sexual orientation, or gender identity.               Review of your medicines      START taking        Dose / Directions    oxyCODONE IR 5 MG tablet   Commonly known as:  ROXICODONE   Used for:  Complete tear of left rotator cuff        Dose:  5-10 mg   Take 1-2 tablets (5-10 mg) by mouth every 3 hours as needed for pain or other (Moderate to Severe)   Quantity:  60 tablet   Refills:  0         CONTINUE these medicines which have NOT CHANGED        Dose / Directions    albuterol 108 (90 Base) MCG/ACT Inhaler   Commonly known as:  PROAIR HFA/PROVENTIL HFA/VENTOLIN HFA        Dose:  2 puff   Inhale 2 puffs into the lungs every 4 hours as needed for shortness of breath / dyspnea or " wheezing   Refills:  0       BUSPAR PO        Dose:  10 mg   Take 10 mg by mouth 2 times daily   Refills:  0       LAMICTAL PO        Dose:  200 mg   Take 200 mg by mouth 2 times daily   Refills:  0       OMEPRAZOLE PO        Dose:  20 mg   Take 20 mg by mouth every morning   Refills:  0       propranolol 10 MG tablet   Commonly known as:  INDERAL        Dose:  10 mg   Take 10 mg by mouth 2 times daily   Refills:  0            Where to get your medicines      Some of these will need a paper prescription and others can be bought over the counter. Ask your nurse if you have questions.     Bring a paper prescription for each of these medications     oxyCODONE IR 5 MG tablet                Protect others around you: Learn how to safely use, store and throw away your medicines at www.disposemymeds.org.        Information about OPIOIDS     PRESCRIPTION OPIOIDS: WHAT YOU NEED TO KNOW    Prescription opioids can be used to help relieve moderate to severe pain and are often prescribed following a surgery or injury, or for certain health conditions. These medications can be an important part of treatment but also come with serious risks. It is important to work with your health care provider to make sure you are getting the safest, most effective care.    WHAT ARE THE RISKS AND SIDE EFFECTS OF OPIOID USE?  Prescription opioids carry serious risks of addiction and overdose, especially with prolonged use. An opioid overdose, often marked by slowed breathing can cause sudden death. The use of prescription opioids can have a number of side effects as well, even when taken as directed:      Tolerance - meaning you might need to take more of a medication for the same pain relief    Physical dependence - meaning you have symptoms of withdrawal when a medication is stopped    Increased sensitivity to pain    Constipation    Nausea, vomiting, and dry mouth    Sleepiness and dizziness    Confusion    Depression    Low levels of  testosterone that can result in lower sex drive, energy, and strength    Itching and sweating    RISKS ARE GREATER WITH:    History of drug misuse, substance use disorder, or overdose    Mental health conditions (such as depression or anxiety)    Sleep apnea    Older age (65 years or older)    Pregnancy    Avoid alcohol while taking prescription opioids.   Also, unless specifically advised by your health care provider, medications to avoid include:    Benzodiazepines (such as Xanax or Valium)    Muscle relaxants (such as Soma or Flexeril)    Hypnotics (such as Ambien or Lunesta)    Other prescription opioids    KNOW YOUR OPTIONS:  Talk to your health care provider about ways to manage your pain that do not involve prescription opioids. Some of these options may actually work better and have fewer risks and side effects:    Pain relievers such as acetaminophen, ibuprofen, and naproxen    Some medications that are also used for depression or seizures    Physical therapy and exercise    Cognitive behavioral therapy, a psychological, goal-directed approach, in which patients learn how to modify physical, behavioral, and emotional triggers of pain and stress    IF YOU ARE PRESCRIBED OPIOIDS FOR PAIN:    Never take opioids in greater amounts or more often than prescribed    Follow up with your primary health care provider and work together to create a plan on how to manage your pain.    Talk about ways to help manage your pain that do not involve prescription opioids    Talk about all concerns and side effects    Help prevent misuse and abuse    Never sell or share prescription opioids    Never use another person's prescription opioids    Store prescription opioids in a secure place and out of reach of others (this may include visitors, children, friends, and family)    Visit www.cdc.gov/drugoverdose to learn about risks of opioid abuse and overdose    If you believe you may be struggling with addiction, tell your health  care provider and ask for guidance or call [facility]'s National Helpline at 3-514-837-HELP    LEARN MORE / www.cdc.gov/drugoverdose/prescribing/guideline.html    Safely dispose of unused prescription opioids: Find your local drug take-back programs and more information about the importance of safe disposal at www.doseofreality.mn.gov             Medication List: This is a list of all your medications and when to take them. Check marks below indicate your daily home schedule. Keep this list as a reference.      Medications           Morning Afternoon Evening Bedtime As Needed    albuterol 108 (90 Base) MCG/ACT Inhaler   Commonly known as:  PROAIR HFA/PROVENTIL HFA/VENTOLIN HFA   Inhale 2 puffs into the lungs every 4 hours as needed for shortness of breath / dyspnea or wheezing                                BUSPAR PO   Take 10 mg by mouth 2 times daily                                LAMICTAL PO   Take 200 mg by mouth 2 times daily                                OMEPRAZOLE PO   Take 20 mg by mouth every morning                                oxyCODONE IR 5 MG tablet   Commonly known as:  ROXICODONE   Take 1-2 tablets (5-10 mg) by mouth every 3 hours as needed for pain or other (Moderate to Severe)   Last time this was given:  5 mg on 4/17/2018 12:39 PM                                propranolol 10 MG tablet   Commonly known as:  INDERAL   Take 10 mg by mouth 2 times daily

## 2018-04-17 NOTE — BRIEF OP NOTE
[facility]  Orthopedics Brief Operative Note    Pre-operative diagnosis: LEFT SHOULDER ROTATOR CUFF TEAR   Post-operative diagnosis Same as Above   Procedure: Procedure(s):  LEFT SHOULDER ARTHROSCOPY, ROTATOR CUFF REPAIR - Wound Class: I-Clean   Surgeon: Amanuel Matthews MD, MD   Assistants(s): Michael Franz PAC   Anesthesia: General    Estimated blood loss: Less than 10 ml    Total IV fluids: (See anesthesia record)   Blood transfusion: No transfusion was given during surgery   Total urine output: (See anesthesia record)   Drains: None   Specimens: None   Implants: 1 Arthrex Suture [place]   Findings: Complete Tear Supraspinatus; U tear; 1.5 CM AP Dimension   Complications: None   Condition: Stable   Comments: See dictated operative report for full details      Dictation Number: [id number]

## 2018-04-17 NOTE — ANESTHESIA POSTPROCEDURE EVALUATION
Patient: Dick Valdovinos    Procedure(s):  LEFT SHOULDER ARTHROSCOPY, ROTATOR CUFF REPAIR - Wound Class: I-Clean    Diagnosis:LEFT SHOULDER ROTATOR CUFF TEAR  Diagnosis Additional Information: No value filed.    Anesthesia Type:  General, ETT    Note:  Anesthesia Post Evaluation    Patient location during evaluation: Phase 2, PACU and Bedside  Patient participation: Able to fully participate in evaluation  Level of consciousness: awake and alert  Pain management: adequate  Airway patency: patent  Cardiovascular status: acceptable  Respiratory status: acceptable  Hydration status: stable  PONV: none     Anesthetic complications: None          Last vitals:  Vitals:    04/17/18 1230 04/17/18 1245 04/17/18 1300   BP: 113/69 126/74 157/63   Resp: 18 16 18   Temp:      SpO2: 93% 94%          Electronically Signed By: Darron Toribio MD  April 17, 2018  3:12 PM

## 2018-04-17 NOTE — IP AVS SNAPSHOT
[facility] Preop/Phase II    [address] [address]    Phone:  [phone]                                       After Visit Summary   4/17/2018    Dick Valdovinos    MRN: [id number]           After Visit Summary Signature Page     I have received my discharge instructions, and my questions have been answered. I have discussed any challenges I see with this plan with the nurse or doctor.    ..........................................................................................................................................  Patient/Patient Representative Signature      ..........................................................................................................................................  Patient Representative Print Name and Relationship to Patient    ..................................................               ................................................  Date                                            Time    ..........................................................................................................................................  Reviewed by Signature/Title    ...................................................              ..............................................  Date                                                            Time

## 2018-04-19 NOTE — ADDENDUM NOTE
Addendum  created 04/19/18 0934 by Juve Cartwright APRN CRNA    Anesthesia Event edited, Procedure Event Log accessed

## 2018-06-06 ENCOUNTER — APPOINTMENT (OUTPATIENT)
Dept: GENERAL RADIOLOGY | Facility: HOSPITAL | Age: 43
End: 2018-06-06
Attending: INTERNAL MEDICINE
Payer: COMMERCIAL

## 2018-06-06 ENCOUNTER — HOSPITAL ENCOUNTER (EMERGENCY)
Facility: HOSPITAL | Age: 43
Discharge: ANOTHER HOSPITAL | End: 2018-06-06
Attending: INTERNAL MEDICINE | Admitting: INTERNAL MEDICINE
Payer: COMMERCIAL

## 2018-06-06 ENCOUNTER — TRANSFERRED RECORDS (OUTPATIENT)
Dept: HEALTH INFORMATION MANAGEMENT | Facility: CLINIC | Age: 43
End: 2018-06-06

## 2018-06-06 VITALS
TEMPERATURE: 98.1 F | SYSTOLIC BLOOD PRESSURE: 106 MMHG | HEART RATE: 97 BPM | RESPIRATION RATE: 16 BRPM | DIASTOLIC BLOOD PRESSURE: 90 MMHG | OXYGEN SATURATION: 97 %

## 2018-06-06 DIAGNOSIS — L03.113 CELLULITIS OF HAND, RIGHT: ICD-10-CM

## 2018-06-06 LAB
ALBUMIN SERPL-MCNC: 3.3 G/DL (ref 3.4–5)
ALP SERPL-CCNC: 102 U/L (ref 40–150)
ALT SERPL W P-5'-P-CCNC: 114 U/L (ref 0–70)
ANION GAP SERPL CALCULATED.3IONS-SCNC: 8 MMOL/L (ref 3–14)
AST SERPL W P-5'-P-CCNC: 279 U/L (ref 0–45)
BASOPHILS # BLD AUTO: 0 10E9/L (ref 0–0.2)
BASOPHILS NFR BLD AUTO: 0.4 %
BILIRUB SERPL-MCNC: 0.6 MG/DL (ref 0.2–1.3)
BUN SERPL-MCNC: 26 MG/DL (ref 7–30)
CALCIUM SERPL-MCNC: 8.2 MG/DL (ref 8.5–10.1)
CHLORIDE SERPL-SCNC: 112 MMOL/L (ref 94–109)
CK SERPL-CCNC: 8951 U/L (ref 30–300)
CO2 SERPL-SCNC: 24 MMOL/L (ref 20–32)
CREAT SERPL-MCNC: 0.97 MG/DL (ref 0.66–1.25)
CRP SERPL-MCNC: 39.5 MG/L (ref 0–8)
DIFFERENTIAL METHOD BLD: ABNORMAL
EOSINOPHIL # BLD AUTO: 0.2 10E9/L (ref 0–0.7)
EOSINOPHIL NFR BLD AUTO: 1.5 %
ERYTHROCYTE [DISTWIDTH] IN BLOOD BY AUTOMATED COUNT: 14.8 % (ref 10–15)
ERYTHROCYTE [SEDIMENTATION RATE] IN BLOOD BY WESTERGREN METHOD: 15 MM/H (ref 0–15)
GFR SERPL CREATININE-BSD FRML MDRD: 85 ML/MIN/1.7M2
GLUCOSE SERPL-MCNC: 113 MG/DL (ref 70–99)
HCT VFR BLD AUTO: 36.5 % (ref 40–53)
HGB BLD-MCNC: 12.2 G/DL (ref 13.3–17.7)
IMM GRANULOCYTES # BLD: 0 10E9/L (ref 0–0.4)
IMM GRANULOCYTES NFR BLD: 0.4 %
LACTATE SERPL-SCNC: 2.3 MMOL/L (ref 0.4–2)
LYMPHOCYTES # BLD AUTO: 2.7 10E9/L (ref 0.8–5.3)
LYMPHOCYTES NFR BLD AUTO: 24.3 %
MCH RBC QN AUTO: 25.6 PG (ref 26.5–33)
MCHC RBC AUTO-ENTMCNC: 33.4 G/DL (ref 31.5–36.5)
MCV RBC AUTO: 77 FL (ref 78–100)
MONOCYTES # BLD AUTO: 1.3 10E9/L (ref 0–1.3)
MONOCYTES NFR BLD AUTO: 11.4 %
NEUTROPHILS # BLD AUTO: 7 10E9/L (ref 1.6–8.3)
NEUTROPHILS NFR BLD AUTO: 62 %
NRBC # BLD AUTO: 0 10*3/UL
NRBC BLD AUTO-RTO: 0 /100
PLATELET # BLD AUTO: 241 10E9/L (ref 150–450)
POTASSIUM SERPL-SCNC: 3.9 MMOL/L (ref 3.4–5.3)
PROT SERPL-MCNC: 7 G/DL (ref 6.8–8.8)
RBC # BLD AUTO: 4.77 10E12/L (ref 4.4–5.9)
SODIUM SERPL-SCNC: 144 MMOL/L (ref 133–144)
WBC # BLD AUTO: 11.3 10E9/L (ref 4–11)

## 2018-06-06 PROCEDURE — 85025 COMPLETE CBC W/AUTO DIFF WBC: CPT | Performed by: INTERNAL MEDICINE

## 2018-06-06 PROCEDURE — 80053 COMPREHEN METABOLIC PANEL: CPT | Performed by: INTERNAL MEDICINE

## 2018-06-06 PROCEDURE — 96365 THER/PROPH/DIAG IV INF INIT: CPT

## 2018-06-06 PROCEDURE — 96366 THER/PROPH/DIAG IV INF ADDON: CPT

## 2018-06-06 PROCEDURE — 83605 ASSAY OF LACTIC ACID: CPT | Performed by: INTERNAL MEDICINE

## 2018-06-06 PROCEDURE — 85652 RBC SED RATE AUTOMATED: CPT | Performed by: INTERNAL MEDICINE

## 2018-06-06 PROCEDURE — 99285 EMERGENCY DEPT VISIT HI MDM: CPT | Mod: 25

## 2018-06-06 PROCEDURE — 73130 X-RAY EXAM OF HAND: CPT | Mod: TC,RT

## 2018-06-06 PROCEDURE — 86140 C-REACTIVE PROTEIN: CPT | Performed by: INTERNAL MEDICINE

## 2018-06-06 PROCEDURE — 82550 ASSAY OF CK (CPK): CPT | Performed by: INTERNAL MEDICINE

## 2018-06-06 PROCEDURE — 36415 COLL VENOUS BLD VENIPUNCTURE: CPT | Performed by: INTERNAL MEDICINE

## 2018-06-06 PROCEDURE — 87040 BLOOD CULTURE FOR BACTERIA: CPT | Performed by: INTERNAL MEDICINE

## 2018-06-06 PROCEDURE — 99285 EMERGENCY DEPT VISIT HI MDM: CPT | Mod: Z6 | Performed by: INTERNAL MEDICINE

## 2018-06-06 PROCEDURE — 25000128 H RX IP 250 OP 636

## 2018-06-06 PROCEDURE — 96367 TX/PROPH/DG ADDL SEQ IV INF: CPT

## 2018-06-06 PROCEDURE — 25000128 H RX IP 250 OP 636: Performed by: INTERNAL MEDICINE

## 2018-06-06 PROCEDURE — 96375 TX/PRO/DX INJ NEW DRUG ADDON: CPT

## 2018-06-06 RX ORDER — KETOROLAC TROMETHAMINE 30 MG/ML
30 INJECTION, SOLUTION INTRAMUSCULAR; INTRAVENOUS ONCE
Status: COMPLETED | OUTPATIENT
Start: 2018-06-06 | End: 2018-06-06

## 2018-06-06 RX ORDER — HYDROMORPHONE HYDROCHLORIDE 1 MG/ML
0.5 INJECTION, SOLUTION INTRAMUSCULAR; INTRAVENOUS; SUBCUTANEOUS ONCE
Status: COMPLETED | OUTPATIENT
Start: 2018-06-06 | End: 2018-06-06

## 2018-06-06 RX ORDER — CEFAZOLIN SODIUM 1 G/50ML
1 INJECTION, SOLUTION INTRAVENOUS ONCE
Status: COMPLETED | OUTPATIENT
Start: 2018-06-06 | End: 2018-06-06

## 2018-06-06 RX ORDER — VANCOMYCIN HYDROCHLORIDE 1 G/200ML
INJECTION, SOLUTION INTRAVENOUS
Status: COMPLETED
Start: 2018-06-06 | End: 2018-06-06

## 2018-06-06 RX ADMIN — KETOROLAC TROMETHAMINE 30 MG: 30 INJECTION, SOLUTION INTRAMUSCULAR at 03:21

## 2018-06-06 RX ADMIN — HYDROMORPHONE HYDROCHLORIDE 0.5 MG: 1 INJECTION, SOLUTION INTRAMUSCULAR; INTRAVENOUS; SUBCUTANEOUS at 04:27

## 2018-06-06 RX ADMIN — CEFAZOLIN SODIUM 1 G: 1 INJECTION, SOLUTION INTRAVENOUS at 06:33

## 2018-06-06 RX ADMIN — VANCOMYCIN HYDROCHLORIDE 1500 MG: 1 INJECTION, SOLUTION INTRAVENOUS at 04:54

## 2018-06-06 ASSESSMENT — ENCOUNTER SYMPTOMS
FREQUENCY: 0
VOMITING: 0
WEAKNESS: 0
BACK PAIN: 0
ABDOMINAL PAIN: 0
DYSURIA: 0
FLANK PAIN: 0
HEADACHES: 0
NUMBNESS: 0
PALPITATIONS: 0
NECK PAIN: 0
ABDOMINAL DISTENTION: 0
ANAL BLEEDING: 0
MYALGIAS: 0
COUGH: 0
SHORTNESS OF BREATH: 0
COLOR CHANGE: 0
CHEST TIGHTNESS: 0
FEVER: 1
WHEEZING: 0
CHILLS: 1
LIGHT-HEADEDNESS: 0
VOICE CHANGE: 0
NAUSEA: 0
CONFUSION: 0
BLOOD IN STOOL: 0
DIAPHORESIS: 0
DIZZINESS: 0
SLEEP DISTURBANCE: 0

## 2018-06-06 NOTE — ED PROVIDER NOTES
History     Chief Complaint   Patient presents with     Cellulitis     The history is provided by the patient.     Dick Valdovinos is a 42 year old male who came for severe R hand pain , mostly R middle finger with swelling and redness of R middle finger. Pt had fever/chill at home, took 1000 mg tylenol. He worked in [employer] and then wood without wearing gloves, multiple abrassions and scratches on both hands was noticed.     Problem List:    There are no active problems to display for this patient.       Past Medical History:    No past medical history on file.    Past Surgical History:    Past Surgical History:   Procedure Laterality Date     ARTHROSCOPY SHOULDER ROTATOR CUFF REPAIR Left 4/17/2018    Procedure: ARTHROSCOPY SHOULDER ROTATOR CUFF REPAIR;  LEFT SHOULDER ARTHROSCOPY, ROTATOR CUFF REPAIR;  Surgeon: Amanuel Matthews MD;  Location: [facility] OR     EYE SURGERY       ORTHOPEDIC SURGERY         Family History:    No family history on file.    Social History:  Marital Status:  Single [1]  Social History   Substance Use Topics     Smoking status: Light Tobacco Smoker     Smokeless tobacco: Never Used     Alcohol use Not on file        Medications:      ACETAMINOPHEN PO   albuterol (PROAIR HFA/PROVENTIL HFA/VENTOLIN HFA) 108 (90 Base) MCG/ACT Inhaler   BusPIRone HCl (BUSPAR PO)   LamoTRIgine (LAMICTAL PO)   OMEPRAZOLE PO   oxyCODONE IR (ROXICODONE) 5 MG tablet   propranolol (INDERAL) 10 MG tablet         Review of Systems   Constitutional: Positive for chills and fever. Negative for diaphoresis.   HENT: Negative for voice change.    Eyes: Negative for visual disturbance.   Respiratory: Negative for cough, chest tightness, shortness of breath and wheezing.    Cardiovascular: Negative for chest pain, palpitations and leg swelling.   Gastrointestinal: Negative for abdominal distention, abdominal pain, anal bleeding, blood in stool, nausea and vomiting.   Genitourinary: Negative for decreased urine  volume, dysuria, flank pain and frequency.   Musculoskeletal: Negative for back pain, gait problem, myalgias and neck pain.   Skin: Negative for color change, pallor and rash.   Neurological: Negative for dizziness, syncope, weakness, light-headedness, numbness and headaches.   Psychiatric/Behavioral: Negative for confusion, sleep disturbance and suicidal ideas.       Physical Exam   BP: 138/74  Pulse: 97  Heart Rate: 75  Temp: 98.9  F (37.2  C)  Resp: 16  SpO2: 97 %      Physical Exam   Constitutional: He is oriented to person, place, and time. He appears well-developed and well-nourished.   HENT:   Head: Normocephalic and atraumatic.   Mouth/Throat: No oropharyngeal exudate.   Eyes: Conjunctivae are normal. Pupils are equal, round, and reactive to light.   Neck: Normal range of motion. Neck supple. No JVD present. No tracheal deviation present. No thyromegaly present.   Cardiovascular: Normal rate, regular rhythm, normal heart sounds and intact distal pulses.  Exam reveals no gallop and no friction rub.    No murmur heard.  Pulmonary/Chest: Effort normal and breath sounds normal. No stridor. No respiratory distress. He has no wheezes. He has no rales. He exhibits no tenderness.   Abdominal: Soft. Bowel sounds are normal. He exhibits no distension and no mass. There is no tenderness. There is no rebound and no guarding.   Musculoskeletal: He exhibits no edema.        Right hand: He exhibits decreased range of motion and tenderness.        Hands:  R middle finger very tender, swelling and erythmatous extending to upper hand.   Not able to extend R middle finger   Lymphadenopathy:     He has no cervical adenopathy.   Neurological: He is alert and oriented to person, place, and time.   Skin: Skin is warm and dry. No rash noted. No erythema. No pallor.   Psychiatric: His behavior is normal.   Nursing note and vitals reviewed.      ED Course     ED Course     Procedures                   Results for orders placed or  performed during the hospital encounter of 06/06/18 (from the past 24 hour(s))   CBC with platelets differential   Result Value Ref Range    WBC 11.3 (H) 4.0 - 11.0 10e9/L    RBC Count 4.77 4.4 - 5.9 10e12/L    Hemoglobin 12.2 (L) 13.3 - 17.7 g/dL    Hematocrit 36.5 (L) 40.0 - 53.0 %    MCV 77 (L) 78 - 100 fl    MCH 25.6 (L) 26.5 - 33.0 pg    MCHC 33.4 31.5 - 36.5 g/dL    RDW 14.8 10.0 - 15.0 %    Platelet Count 241 150 - 450 10e9/L    Diff Method Automated Method     % Neutrophils 62.0 %    % Lymphocytes 24.3 %    % Monocytes 11.4 %    % Eosinophils 1.5 %    % Basophils 0.4 %    % Immature Granulocytes 0.4 %    Nucleated RBCs 0 0 /100    Absolute Neutrophil 7.0 1.6 - 8.3 10e9/L    Absolute Lymphocytes 2.7 0.8 - 5.3 10e9/L    Absolute Monocytes 1.3 0.0 - 1.3 10e9/L    Absolute Eosinophils 0.2 0.0 - 0.7 10e9/L    Absolute Basophils 0.0 0.0 - 0.2 10e9/L    Abs Immature Granulocytes 0.0 0 - 0.4 10e9/L    Absolute Nucleated RBC 0.0    Comprehensive metabolic panel   Result Value Ref Range    Sodium 144 133 - 144 mmol/L    Potassium 3.9 3.4 - 5.3 mmol/L    Chloride 112 (H) 94 - 109 mmol/L    Carbon Dioxide 24 20 - 32 mmol/L    Anion Gap 8 3 - 14 mmol/L    Glucose 113 (H) 70 - 99 mg/dL    Urea Nitrogen 26 7 - 30 mg/dL    Creatinine 0.97 0.66 - 1.25 mg/dL    GFR Estimate 85 >60 mL/min/1.7m2    GFR Estimate If Black >90 >60 mL/min/1.7m2    Calcium 8.2 (L) 8.5 - 10.1 mg/dL    Bilirubin Total 0.6 0.2 - 1.3 mg/dL    Albumin 3.3 (L) 3.4 - 5.0 g/dL    Protein Total 7.0 6.8 - 8.8 g/dL    Alkaline Phosphatase 102 40 - 150 U/L     (H) 0 - 70 U/L     (H) 0 - 45 U/L   CK total   Result Value Ref Range    CK Total 8951 (H) 30 - 300 U/L   CRP inflammation   Result Value Ref Range    CRP Inflammation 39.5 (H) 0.0 - 8.0 mg/L   Erythrocyte sedimentation rate auto   Result Value Ref Range    Sed Rate 15 0 - 15 mm/h   Lactic acid   Result Value Ref Range    Lactic Acid 2.3 (H) 0.4 - 2.0 mmol/L       Medications   vancomycin  (VANCOCIN) 1,500 mg in sodium chloride 0.9 % 500 mL intermittent infusion (1,500 mg Intravenous Not Given 6/6/18 2268)   ceFAZolin (ANCEF) intermittent infusion 1 g (not administered)   ketorolac (TORADOL) injection 30 mg (30 mg Intravenous Given 6/6/18 2531)   HYDROmorphone (PF) (DILAUDID) injection 0.5 mg (0.5 mg Intravenous Given 6/6/18 5627)   vancomycin in dextrose (VANCOCIN) 1-5 GM/200ML-% infusion (1,500 mg  New Bag 6/6/18 3377)       Assessments & Plan (with Medical Decision Making)   Hand/ R middle finger cellulitis/ tendonitis  CK level about 9000, CRP about 40, WBC 11  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   I spoke to Dr Serrano , hand surgery, recommended transferring to [facility] ER for evaluation of the hand.   IV vanco/ ancef given, blood cultures were sent  I spoke to Dr thomson, accepted for transfer.   Pt agreed with transfer, plan was that his wife will drive her to [facility], but later patient requested for transfer via Ambulance.     I have reviewed the nursing notes.    I have reviewed the findings, diagnosis, plan and need for follow up with the patient.      New Prescriptions    No medications on file       Final diagnoses:   Cellulitis of hand, right       6/6/2018   [facility] EMERGENCY DEPARTMENT     Hill Tineo MD  06/06/18 0543       Hill Tineo MD  06/06/18 0687

## 2018-06-06 NOTE — ED NOTES
Face to face report given with opportunity to observe patient.  Report given to KRYSTLE Maldonado.    RANJIT AGUILAR  6/6/2018, 7:09 AM

## 2018-06-06 NOTE — ED NOTES
Hand off report given to paramedics, pt loaded into ambulance, pt's records sent with pt, Hand off report given to Porsha GUERRERO RN at [facility] ED with opportunity for questions.

## 2018-06-06 NOTE — ED NOTES
DATE:  6/6/2018   TIME OF RECEIPT FROM LAB:  0418  LAB TEST:  lacticLAB VALUE:  2.3  RESULTS GIVEN WITH READ-BACK TO (PROVIDER):  0418  TIME LAB VALUE REPORTED TO PROVIDER:   0418
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
## 2018-06-06 NOTE — ED NOTES
"Pt escorted by , ambulated to his vehicle to \"roll up car windows, leave the keys for my wife.\"  "

## 2018-06-12 LAB
BACTERIA SPEC CULT: NORMAL
BACTERIA SPEC CULT: NORMAL
SPECIMEN SOURCE: NORMAL
SPECIMEN SOURCE: NORMAL

## 2018-06-17 ENCOUNTER — HOSPITAL ENCOUNTER (EMERGENCY)
Facility: HOSPITAL | Age: 43
Discharge: HOME | End: 2018-06-17
Attending: NURSE PRACTITIONER | Admitting: NURSE PRACTITIONER
Payer: COMMERCIAL

## 2018-06-17 VITALS
HEART RATE: 69 BPM | RESPIRATION RATE: 16 BRPM | SYSTOLIC BLOOD PRESSURE: 120 MMHG | OXYGEN SATURATION: 98 % | DIASTOLIC BLOOD PRESSURE: 62 MMHG | TEMPERATURE: 97.5 F

## 2018-06-17 DIAGNOSIS — Z86.14 HISTORY OF MRSA INFECTION: ICD-10-CM

## 2018-06-17 DIAGNOSIS — Z86.19 HX OF SEPSIS: ICD-10-CM

## 2018-06-17 DIAGNOSIS — L03.114 CELLULITIS OF LEFT UPPER EXTREMITY: ICD-10-CM

## 2018-06-17 LAB
ALBUMIN SERPL-MCNC: 3.4 G/DL (ref 3.4–5)
ALP SERPL-CCNC: 129 U/L (ref 40–150)
ALT SERPL W P-5'-P-CCNC: 74 U/L (ref 0–70)
ANION GAP SERPL CALCULATED.3IONS-SCNC: 6 MMOL/L (ref 3–14)
AST SERPL W P-5'-P-CCNC: 27 U/L (ref 0–45)
BASOPHILS # BLD AUTO: 0 10E9/L (ref 0–0.2)
BASOPHILS NFR BLD AUTO: 0.3 %
BILIRUB SERPL-MCNC: 0.2 MG/DL (ref 0.2–1.3)
BUN SERPL-MCNC: 11 MG/DL (ref 7–30)
CALCIUM SERPL-MCNC: 8.7 MG/DL (ref 8.5–10.1)
CHLORIDE SERPL-SCNC: 110 MMOL/L (ref 94–109)
CO2 SERPL-SCNC: 25 MMOL/L (ref 20–32)
CREAT SERPL-MCNC: 0.86 MG/DL (ref 0.66–1.25)
CRP SERPL-MCNC: 9.9 MG/L (ref 0–8)
DIFFERENTIAL METHOD BLD: ABNORMAL
EOSINOPHIL # BLD AUTO: 0.3 10E9/L (ref 0–0.7)
EOSINOPHIL NFR BLD AUTO: 3.1 %
ERYTHROCYTE [DISTWIDTH] IN BLOOD BY AUTOMATED COUNT: 15.5 % (ref 10–15)
GFR SERPL CREATININE-BSD FRML MDRD: >90 ML/MIN/1.7M2
GLUCOSE SERPL-MCNC: 97 MG/DL (ref 70–99)
HCT VFR BLD AUTO: 37 % (ref 40–53)
HGB BLD-MCNC: 12.2 G/DL (ref 13.3–17.7)
IMM GRANULOCYTES # BLD: 0 10E9/L (ref 0–0.4)
IMM GRANULOCYTES NFR BLD: 0.4 %
LYMPHOCYTES # BLD AUTO: 2.6 10E9/L (ref 0.8–5.3)
LYMPHOCYTES NFR BLD AUTO: 25.1 %
MCH RBC QN AUTO: 25.9 PG (ref 26.5–33)
MCHC RBC AUTO-ENTMCNC: 33 G/DL (ref 31.5–36.5)
MCV RBC AUTO: 79 FL (ref 78–100)
MONOCYTES # BLD AUTO: 0.7 10E9/L (ref 0–1.3)
MONOCYTES NFR BLD AUTO: 6.8 %
NEUTROPHILS # BLD AUTO: 6.5 10E9/L (ref 1.6–8.3)
NEUTROPHILS NFR BLD AUTO: 64.3 %
NRBC # BLD AUTO: 0 10*3/UL
NRBC BLD AUTO-RTO: 0 /100
PLATELET # BLD AUTO: 312 10E9/L (ref 150–450)
POTASSIUM SERPL-SCNC: 3.6 MMOL/L (ref 3.4–5.3)
PROT SERPL-MCNC: 7.1 G/DL (ref 6.8–8.8)
RBC # BLD AUTO: 4.71 10E12/L (ref 4.4–5.9)
SODIUM SERPL-SCNC: 141 MMOL/L (ref 133–144)
WBC # BLD AUTO: 10.2 10E9/L (ref 4–11)

## 2018-06-17 PROCEDURE — 96365 THER/PROPH/DIAG IV INF INIT: CPT

## 2018-06-17 PROCEDURE — 25000125 ZZHC RX 250: Performed by: NURSE PRACTITIONER

## 2018-06-17 PROCEDURE — 85025 COMPLETE CBC W/AUTO DIFF WBC: CPT | Performed by: NURSE PRACTITIONER

## 2018-06-17 PROCEDURE — 80053 COMPREHEN METABOLIC PANEL: CPT | Performed by: NURSE PRACTITIONER

## 2018-06-17 PROCEDURE — 99214 OFFICE O/P EST MOD 30 MIN: CPT | Mod: 24 | Performed by: NURSE PRACTITIONER

## 2018-06-17 PROCEDURE — 36415 COLL VENOUS BLD VENIPUNCTURE: CPT | Performed by: NURSE PRACTITIONER

## 2018-06-17 PROCEDURE — 86140 C-REACTIVE PROTEIN: CPT | Performed by: NURSE PRACTITIONER

## 2018-06-17 PROCEDURE — G0463 HOSPITAL OUTPT CLINIC VISIT: HCPCS | Mod: 25

## 2018-06-17 PROCEDURE — 25000132 ZZH RX MED GY IP 250 OP 250 PS 637: Performed by: NURSE PRACTITIONER

## 2018-06-17 RX ORDER — CLINDAMYCIN PHOSPHATE 600 MG/50ML
600 INJECTION, SOLUTION INTRAVENOUS ONCE
Status: COMPLETED | OUTPATIENT
Start: 2018-06-17 | End: 2018-06-17

## 2018-06-17 RX ORDER — CLINDAMYCIN HCL 300 MG
300 CAPSULE ORAL 4 TIMES DAILY
Qty: 40 CAPSULE | Refills: 0 | Status: SHIPPED | OUTPATIENT
Start: 2018-06-17 | End: 2018-06-27

## 2018-06-17 RX ORDER — SACCHAROMYCES BOULARDII 250 MG
500 CAPSULE ORAL ONCE
Status: COMPLETED | OUTPATIENT
Start: 2018-06-17 | End: 2018-06-17

## 2018-06-17 RX ADMIN — Medication 500 MG: at 16:17

## 2018-06-17 RX ADMIN — CLINDAMYCIN PHOSPHATE 600 MG: 12 INJECTION, SOLUTION INTRAVENOUS at 16:17

## 2018-06-17 ASSESSMENT — ENCOUNTER SYMPTOMS
CHILLS: 0
GASTROINTESTINAL NEGATIVE: 1
APPETITE CHANGE: 0
MUSCULOSKELETAL NEGATIVE: 1
COLOR CHANGE: 1
FEVER: 0
RESPIRATORY NEGATIVE: 1

## 2018-06-17 NOTE — DISCHARGE INSTRUCTIONS
Take probiotic 2 times a day for 2 weeks.   You can also eat yogurt daily that has probiotics in it as well.    Stop taking your other antibiotic - ?Amoxicillin.   Start clindamycin tonight and take it until it is gone.   Call for follow up appointment to see PCP in 1-2 days for re-evaluation.   Return here if symptoms worsen.         Cellulitis  Cellulitis is an infection of the deep layers of skin. A break in the skin, such as a cut or scratch, can let bacteria under the skin. If the bacteria get to deep layers of the skin, it can be serious. If not treated, cellulitis can get into the bloodstream and lymph nodes. The infection can then spread throughout the body. This causes serious illness.  Cellulitis causes the affected skin to become red, swollen, warm, and sore. The reddened areas have a visible border. An open sore may leak fluid (pus). You may have a fever, chills, and pain.  Cellulitis is treated with antibiotics taken for 7 to 10 days. An open sore may be cleaned and covered with cool wet gauze. Symptoms should get better 1 to 2 days after treatment is started. Make sure to take all the antibiotics for the full number of days until they are gone. Keep taking the medicine even if your symptoms go away.  Home care  Follow these tips:    Take all of the antibiotic medicine exactly as directed until it is gone. Do not miss any doses, especially during the first 7 days. Don t stop taking the medicine when your symptoms get better.    Keep the affected area clean and dry.    Wash your hands with soap and warm water before and after touching your skin. Anyone else who touches your skin should also wash his or her hands. Don't share towels.  Follow-up care  Follow up with your healthcare provider in 1-2 days for re-evaluation.   When to seek medical advice  Call your healthcare provider right away if any of these occur:    Red areas that spread    Swelling or pain that gets worse    Fluid leaking from the skin  (pus)    Fever higher of 100.4  F (38.0  C) or higher after 2 days on antibiotics       Staph Infection (MRSA)  Staph is the short name for the common bacteria called staphylococcus aureus. Staph bacteria are often present on the skin without causing an infection. If it gets under the skin an infection occurs. This causes redness, tenderness, swelling and sometimes fluid drainage.  MRSA stands for methicillin-resistant staph aureus. Unlike a common staph infection, MRSA bacteria are resistant to the usual antibiotics and harder to treat. Also, MRSA is more toxic than common staph bacteria. It can spread quickly throughout the body and cause a life-threatening illness.  MRSA is spread to others by direct physical contact with the bacteria. MRSA can also be transmitted from items contaminated by a person who has the bacteria, such as bandages, towels, bed sheets, or sports equipment. It is generally not spread through the air.  But you can acquire it if you come in direct contact with the fluid from someone's cough or sneeze.  Once you have a MRSA skin infection, you are at risk of having it again in the future.  If MRSA infection is suspected, the healthcare provider may take a wound culture to confirm the diagnosis. If an abscess is present, it may be drained. One or more antibiotics that work against MRSA will likely be prescribed.  Home care    Take any antibiotics prescribed exactly as directed. Do not stop taking them until they are gone or your healthcare provider tells you to stop, even if you feel better.    If antibiotic ointment was prescribed, use it as directed.    Keep wounds covered with clean, dry bandages. Change dressings as they become soiled. Wash your hands well each time you change the bandage or touch the wound.  Treating household members  If you have been diagnosed with possible MRSA infection, those living with you are at higher risk of carrying the bacteria on their skin or in their nose,  even if there is no sign of infection. Bacteria must be removed from the skin of all household members at the same time so it is not passed back and forth. Advise them to remove the bacteria as follows:    If anyone in the household has a skin infection, it must be treated by a healthcare provider. Washing alone will not treat a MRSA infection.    Clean counter tops and children's toys.    Do not share personal items such as toothbrush and razors. It is okay to share glasses, plates, and utensils.  Preventing spread of infection    Wash your hands frequently with plain soap and warm water. Be certain to clean under the fingernails, between the fingers, and the wrists. Dry hands with a single use towel (for example a paper towel). If soap and water are not available, you can use an alcohol-based hand . Rub the  over the entire surface of the hands, fingers, and wrists until dry.    Avoid sharing personal items such as towels, washcloths, razors, clothing, or uniforms. Wash soiled sheets, towels or clothes in hot water with laundry detergent. Use an automatic clothes dryer set on high to kill any remaining bacteria.    If you use a gym, wipe down equipment with an alcohol-based  before and after each use.  Wipe the handgrips as well.    If you participate in sports, shower with plain soap after every activity. Use a clean towel for each shower.  Follow-up care  Follow-up with your healthcare provider or as advised by our staff. If a wound culture was taken, call as directed for the results. You will be told about any changes to your treatment.  If you are diagnosed with MRSA, tell medical personnel in the future that you have been treated for this type of infection.  When to seek medical advice  Call your healthcare provider if any of the following occur:    Increasing redness, swelling or pain    Red streaks in the skin around the wound    Weakness or dizziness    New appearance of pus or  drainage from the wound    New fever over 100.4  F (38.0  C), or as directed by the healthcare provider

## 2018-06-17 NOTE — ED NOTES
All discharge instructions and avs discussed with patient.  Pt able to verbalize home care, follow up and medication management.  [facility]  All belongings sent home

## 2018-06-17 NOTE — ED AVS SNAPSHOT
[facility]    [address] [place] [address]    Phone:  [phone]                                       Dick Valdovinos   MRN: [id number]    Department:  [facility]   Date of Visit:  6/17/2018           After Visit Summary Signature Page     I have received my discharge instructions, and my questions have been answered. I have discussed any challenges I see with this plan with the nurse or doctor.    ..........................................................................................................................................  Patient/Patient Representative Signature      ..........................................................................................................................................  Patient Representative Print Name and Relationship to Patient    ..................................................               ................................................  Date                                            Time    ..........................................................................................................................................  Reviewed by Signature/Title    ...................................................              ..............................................  Date                                                            Time

## 2018-06-17 NOTE — ED AVS SNAPSHOT
[facility]    [address] [address]    Phone:  [phone]                                       Dick Valdovinos   MRN: [id number]    Department:  [facility]   Date of Visit:  6/17/2018           Patient Information     Date Of Birth          1975        Your diagnoses for this visit were:     Cellulitis of left upper extremity     History of MRSA infection        You were seen by Sameera Hernandez NP.      Follow-up Information     Follow up with Stanley Amador MD. Schedule an appointment as soon as possible for a visit in 2 days.    Specialty:  Family Practice    Why:  for re-evaluation    Contact information:    [facility]  [address] [address] [phone]          Follow up with [facility].    Specialty:  EMERGENCY MEDICINE    Why:  If symptoms worsen    Contact information:    [address] [address] [phone]    Additional information:    From [place]: Take [address]. Turn left at [address]. Turn left at the first stoplight on [address]. At the first stop sign, take a right onto [address]. Take a left into the parking lot and continue through until you reach the North enterance of the building.       From [place]: Take [address]. Take the [address] ramp towards [place]. Turn left onto [address]. Take a slight right onto [address]. Turn left at the first stoplight on [address]. At the first stop sign, take a right onto [address]. Take a left into the parking lot and continue through until you reach the North enterance of the building.       From [place]: Take [address]. Take a right at [address]. At the first stop sign, take a right onto [address]. Take a left into the parking lot and continue through until you reach the North enterance of the building.         Discharge  Instructions       Take probiotic 2 times a day for 2 weeks.   You can also eat yogurt daily that has probiotics in it as well.    Stop taking your other antibiotic - ?Amoxicillin.   Start clindamycin tonight and take it until it is gone.   Call for follow up appointment to see PCP in 1-2 days for re-evaluation.   Return here if symptoms worsen.         Cellulitis  Cellulitis is an infection of the deep layers of skin. A break in the skin, such as a cut or scratch, can let bacteria under the skin. If the bacteria get to deep layers of the skin, it can be serious. If not treated, cellulitis can get into the bloodstream and lymph nodes. The infection can then spread throughout the body. This causes serious illness.  Cellulitis causes the affected skin to become red, swollen, warm, and sore. The reddened areas have a visible border. An open sore may leak fluid (pus). You may have a fever, chills, and pain.  Cellulitis is treated with antibiotics taken for 7 to 10 days. An open sore may be cleaned and covered with cool wet gauze. Symptoms should get better 1 to 2 days after treatment is started. Make sure to take all the antibiotics for the full number of days until they are gone. Keep taking the medicine even if your symptoms go away.  Home care  Follow these tips:    Take all of the antibiotic medicine exactly as directed until it is gone. Do not miss any doses, especially during the first 7 days. Don t stop taking the medicine when your symptoms get better.    Keep the affected area clean and dry.    Wash your hands with soap and warm water before and after touching your skin. Anyone else who touches your skin should also wash his or her hands. Don't share towels.  Follow-up care  Follow up with your healthcare provider in 1-2 days for re-evaluation.   When to seek medical advice  Call your healthcare provider right away if any of these occur:    Red areas that spread    Swelling or pain that gets worse    Fluid  leaking from the skin (pus)    Fever higher of 100.4  F (38.0  C) or higher after 2 days on antibiotics       Staph Infection (MRSA)  Staph is the short name for the common bacteria called staphylococcus aureus. Staph bacteria are often present on the skin without causing an infection. If it gets under the skin an infection occurs. This causes redness, tenderness, swelling and sometimes fluid drainage.  MRSA stands for methicillin-resistant staph aureus. Unlike a common staph infection, MRSA bacteria are resistant to the usual antibiotics and harder to treat. Also, MRSA is more toxic than common staph bacteria. It can spread quickly throughout the body and cause a life-threatening illness.  MRSA is spread to others by direct physical contact with the bacteria. MRSA can also be transmitted from items contaminated by a person who has the bacteria, such as bandages, towels, bed sheets, or sports equipment. It is generally not spread through the air.  But you can acquire it if you come in direct contact with the fluid from someone's cough or sneeze.  Once you have a MRSA skin infection, you are at risk of having it again in the future.  If MRSA infection is suspected, the healthcare provider may take a wound culture to confirm the diagnosis. If an abscess is present, it may be drained. One or more antibiotics that work against MRSA will likely be prescribed.  Home care    Take any antibiotics prescribed exactly as directed. Do not stop taking them until they are gone or your healthcare provider tells you to stop, even if you feel better.    If antibiotic ointment was prescribed, use it as directed.    Keep wounds covered with clean, dry bandages. Change dressings as they become soiled. Wash your hands well each time you change the bandage or touch the wound.  Treating household members  If you have been diagnosed with possible MRSA infection, those living with you are at higher risk of carrying the bacteria on their  skin or in their nose, even if there is no sign of infection. Bacteria must be removed from the skin of all household members at the same time so it is not passed back and forth. Advise them to remove the bacteria as follows:    If anyone in the household has a skin infection, it must be treated by a healthcare provider. Washing alone will not treat a MRSA infection.    Clean counter tops and children's toys.    Do not share personal items such as toothbrush and razors. It is okay to share glasses, plates, and utensils.  Preventing spread of infection    Wash your hands frequently with plain soap and warm water. Be certain to clean under the fingernails, between the fingers, and the wrists. Dry hands with a single use towel (for example a paper towel). If soap and water are not available, you can use an alcohol-based hand . Rub the  over the entire surface of the hands, fingers, and wrists until dry.    Avoid sharing personal items such as towels, washcloths, razors, clothing, or uniforms. Wash soiled sheets, towels or clothes in hot water with laundry detergent. Use an automatic clothes dryer set on high to kill any remaining bacteria.    If you use a gym, wipe down equipment with an alcohol-based  before and after each use.  Wipe the handgrips as well.    If you participate in sports, shower with plain soap after every activity. Use a clean towel for each shower.  Follow-up care  Follow-up with your healthcare provider or as advised by our staff. If a wound culture was taken, call as directed for the results. You will be told about any changes to your treatment.  If you are diagnosed with MRSA, tell medical personnel in the future that you have been treated for this type of infection.  When to seek medical advice  Call your healthcare provider if any of the following occur:    Increasing redness, swelling or pain    Red streaks in the skin around the wound    Weakness or dizziness    New  appearance of pus or drainage from the wound    New fever over 100.4  F (38.0  C), or as directed by the healthcare provider           Review of your medicines      START taking        Dose / Directions Last dose taken    clindamycin 300 MG capsule   Commonly known as:  CLEOCIN   Dose:  300 mg   Quantity:  40 capsule        Take 1 capsule (300 mg) by mouth 4 times daily for 10 days   Refills:  0        probiotic Caps   Quantity:  56 capsule        Take 2 capsules PO 2 times daily for 2 weeks   Refills:  0          Our records show that you are taking the medicines listed below. If these are incorrect, please call your family doctor or clinic.        Dose / Directions Last dose taken    ACETAMINOPHEN PO   Dose:  1000 mg        Take 1,000 mg by mouth   Refills:  0        albuterol 108 (90 Base) MCG/ACT Inhaler   Commonly known as:  PROAIR HFA/PROVENTIL HFA/VENTOLIN HFA   Dose:  2 puff        Inhale 2 puffs into the lungs every 4 hours as needed for shortness of breath / dyspnea or wheezing   Refills:  0        BUSPAR PO   Dose:  10 mg        Take 10 mg by mouth 2 times daily   Refills:  0        LAMICTAL PO   Dose:  200 mg        Take 200 mg by mouth 2 times daily   Refills:  0        OMEPRAZOLE PO   Dose:  20 mg        Take 20 mg by mouth every morning   Refills:  0        propranolol 10 MG tablet   Commonly known as:  INDERAL   Dose:  10 mg        Take 10 mg by mouth 2 times daily   Refills:  0          STOP taking        Dose Reason for stopping Comments    AMOXICILLIN PO                      Prescriptions were sent or printed at these locations (2 Prescriptions)                   [id number] [place] - [address] AT [facility] [address]   [address], [place] [address]    Telephone:  [phone]   Fax:  [phone]   Hours:                  E-Prescribed (2 of 2)         clindamycin (CLEOCIN) 300 MG capsule               probiotic CAPS                Procedures and tests performed  "during your visit     CBC with platelets differential    CRP inflammation    Comprehensive metabolic panel      Orders Needing Specimen Collection     None      Pending Results     No orders found from 6/15/2018 to 2018.            Pending Culture Results     No orders found from 6/15/2018 to 2018.            Thank you for choosing [facility]       Thank you for choosing [facility] for your care. Our goal is always to provide you with excellent care. Hearing back from our patients is one way we can continue to improve our services. Please take a few minutes to complete the written survey that you may receive in the mail after you visit with us. Thank you!        eEye Information     eEye lets you send messages to your doctor, view your test results, renew your prescriptions, schedule appointments and more. To sign up, go to [email or web address] . Click on \"Log in\" on the left side of the screen, which will take you to the Welcome page. Then click on \"Sign up Now\" on the right side of the page.     You will be asked to enter the access code listed below, as well as some personal information. Please follow the directions to create your username and password.     Your access code is: [email or web address]  Expires: 9/15/2018  4:47 PM     Your access code will  in 90 days. If you need help or a new code, please call your [facility] clinic or [phone].        Care EveryWhere ID     This is your Care EveryWhere ID. This could be used by other organizations to access your [facility] medical records  [id number]        Equal Access to Services     GEETHA FIGUEREDO : Hadii zarina choi Solexa, waaxda luqadaha, qaybta kaalmada adeegyada, waxay yovany leone . So [facility] [phone].    ATENCIÓN: Si habla español, tiene a [facility]axter disposición servicios gratuitos de asistencia lingüística. Llame al [phone].    We comply with applicable federal civil rights laws and [place] laws. We do not " discriminate on the basis of race, color, national origin, age, disability, sex, sexual orientation, or gender identity.            After Visit Summary       This is your record. Keep this with you and show to your community pharmacist(s) and doctor(s) at your next visit.

## 2018-06-17 NOTE — ED NOTES
Pt reports to urgent care for abscess on left inner forearm.  Pt states that it started 2 days ago.   Denies fever.  Reports increased swelling down his arm.  Pt also report recent surgery for infection on his finger and is currntly taking antibiotics

## 2018-06-17 NOTE — ED PROVIDER NOTES
History     Chief Complaint   Patient presents with     Abscess     Left inner arm X 2 days     The history is provided by the patient. No  was used.     Dick Valdovinos is a 42 year old male who presents with a swollen red area to his left inner forearm that started 2 days ago. Has a history of MRSA after surgery. Recently had a septic his right finger that required surgical repair in [place]. Denies injury to the left forearm, denies IV drug use. He did have an IV started in this arm with surgery that was done 12 days ago but it was above this area. Is currently on Amoxicillin for his finger.     Problem List:    There are no active problems to display for this patient.       Past Medical History:    History reviewed. No pertinent past medical history.    Past Surgical History:    Past Surgical History:   Procedure Laterality Date     ARTHROSCOPY SHOULDER ROTATOR CUFF REPAIR Left 4/17/2018    Procedure: ARTHROSCOPY SHOULDER ROTATOR CUFF REPAIR;  LEFT SHOULDER ARTHROSCOPY, ROTATOR CUFF REPAIR;  Surgeon: Amanuel Matthews MD;  Location: [facility]     EYE SURGERY       ORTHOPEDIC SURGERY         Family History:    No family history on file.    Social History:  Marital Status:  Single [1]  Social History   Substance Use Topics     Smoking status: Light Tobacco Smoker     Smokeless tobacco: Never Used     Alcohol use No        Medications:      BusPIRone HCl (BUSPAR PO)   clindamycin (CLEOCIN) 300 MG capsule   LamoTRIgine (LAMICTAL PO)   OMEPRAZOLE PO   probiotic CAPS   propranolol (INDERAL) 10 MG tablet   ACETAMINOPHEN PO   albuterol (PROAIR HFA/PROVENTIL HFA/VENTOLIN HFA) 108 (90 Base) MCG/ACT Inhaler         Review of Systems   Constitutional: Negative for appetite change, chills and fever.        Was feeling a bit ill, nothing significant to mention   HENT: Negative.    Respiratory: Negative.    Gastrointestinal: Negative.    Musculoskeletal: Negative.    Skin: Positive for color change (Red, swollen  area to left inner forearm).       Physical Exam   BP: 127/63  Pulse: 69  Temp: 97.2  F (36.2  C)  Resp: 20  SpO2: 98 %      Physical Exam   Constitutional: He is oriented to person, place, and time. He appears well-developed and well-nourished. No distress.   HENT:   Head: Normocephalic and atraumatic.   Right Ear: External ear normal.   Left Ear: External ear normal.   Nose: Nose normal.   Eyes: Conjunctivae are normal. No scleral icterus.   Neck: Normal range of motion. Neck supple.   Cardiovascular: Normal rate, regular rhythm and normal heart sounds.    Pulmonary/Chest: Effort normal and breath sounds normal.   Musculoskeletal: Normal range of motion.        Arms:  Right middle finger is currently bandaged recovering post-operatively. Healing well. No erythema, tenderness or drainage noted.    Neurological: He is alert and oriented to person, place, and time.   Skin: Skin is warm and dry. He is not diaphoretic.   Psychiatric: He has a normal mood and affect. His behavior is normal.   Nursing note and vitals reviewed.      ED Course     ED Course     Procedures       Results for orders placed or performed during the hospital encounter of 06/17/18 (from the past 24 hour(s))   CBC with platelets differential   Result Value Ref Range    WBC 10.2 4.0 - 11.0 10e9/L    RBC Count 4.71 4.4 - 5.9 10e12/L    Hemoglobin 12.2 (L) 13.3 - 17.7 g/dL    Hematocrit 37.0 (L) 40.0 - 53.0 %    MCV 79 78 - 100 fl    MCH 25.9 (L) 26.5 - 33.0 pg    MCHC 33.0 31.5 - 36.5 g/dL    RDW 15.5 (H) 10.0 - 15.0 %    Platelet Count 312 150 - 450 10e9/L    Diff Method Automated Method     % Neutrophils 64.3 %    % Lymphocytes 25.1 %    % Monocytes 6.8 %    % Eosinophils 3.1 %    % Basophils 0.3 %    % Immature Granulocytes 0.4 %    Nucleated RBCs 0 0 /100    Absolute Neutrophil 6.5 1.6 - 8.3 10e9/L    Absolute Lymphocytes 2.6 0.8 - 5.3 10e9/L    Absolute Monocytes 0.7 0.0 - 1.3 10e9/L    Absolute Eosinophils 0.3 0.0 - 0.7 10e9/L    Absolute  Basophils 0.0 0.0 - 0.2 10e9/L    Abs Immature Granulocytes 0.0 0 - 0.4 10e9/L    Absolute Nucleated RBC 0.0    CRP inflammation   Result Value Ref Range    CRP Inflammation 9.9 (H) 0.0 - 8.0 mg/L   Comprehensive metabolic panel   Result Value Ref Range    Sodium 141 133 - 144 mmol/L    Potassium 3.6 3.4 - 5.3 mmol/L    Chloride 110 (H) 94 - 109 mmol/L    Carbon Dioxide 25 20 - 32 mmol/L    Anion Gap 6 3 - 14 mmol/L    Glucose 97 70 - 99 mg/dL    Urea Nitrogen 11 7 - 30 mg/dL    Creatinine 0.86 0.66 - 1.25 mg/dL    GFR Estimate >90 >60 mL/min/1.7m2    GFR Estimate If Black >90 >60 mL/min/1.7m2    Calcium 8.7 8.5 - 10.1 mg/dL    Bilirubin Total 0.2 0.2 - 1.3 mg/dL    Albumin 3.4 3.4 - 5.0 g/dL    Protein Total 7.1 6.8 - 8.8 g/dL    Alkaline Phosphatase 129 40 - 150 U/L    ALT 74 (H) 0 - 70 U/L    AST 27 0 - 45 U/L       Medications   clindamycin (CLEOCIN) infusion 600 mg (0 mg Intravenous Stopped 6/17/18 1650)   saccharomyces boulardii (FLORASTOR) capsule 500 mg (500 mg Oral Given 6/17/18 1617)       Assessments & Plan (with Medical Decision Making)     I have reviewed the nursing notes.  I have reviewed the findings, diagnosis, plan and need for follow up with the patient.  WBC 10.2, afebrile.   Will discharge home with oral antibiotics and probiotics.   Stressed close f/u.   Given Epic educational materials.     New Prescriptions    CLINDAMYCIN (CLEOCIN) 300 MG CAPSULE    Take 1 capsule (300 mg) by mouth 4 times daily for 10 days    PROBIOTIC CAPS    Take 2 capsules PO 2 times daily for 2 weeks       Final diagnoses:   Cellulitis of left upper extremity   History of MRSA infection   Hx of sepsis - In right middle finger     Advised:   Take probiotic 2 times a day for 2 weeks.   You can also eat yogurt daily that has probiotics in it as well.    Stop taking your other antibiotic - ?Amoxicillin.   Start clindamycin tonight and take it until it is gone.   Call for follow up appointment to see PCP in 1-2 days for  re-evaluation.   Return here if symptoms worsen.     6/17/2018   [facility]     Sameera Hernandez NP  06/17/18 [id number]

## 2018-09-24 ENCOUNTER — HOSPITAL ENCOUNTER (EMERGENCY)
Facility: HOSPITAL | Age: 43
Discharge: HOME | End: 2018-09-24
Attending: EMERGENCY MEDICINE | Admitting: EMERGENCY MEDICINE
Payer: COMMERCIAL

## 2018-09-24 VITALS
WEIGHT: 205 LBS | DIASTOLIC BLOOD PRESSURE: 92 MMHG | OXYGEN SATURATION: 97 % | BODY MASS INDEX: 30.36 KG/M2 | TEMPERATURE: 96 F | HEIGHT: 69 IN | SYSTOLIC BLOOD PRESSURE: 134 MMHG | RESPIRATION RATE: 20 BRPM

## 2018-09-24 DIAGNOSIS — R11.2 INTRACTABLE VOMITING WITH NAUSEA, UNSPECIFIED VOMITING TYPE: Primary | ICD-10-CM

## 2018-09-24 DIAGNOSIS — F12.10 CANNABIS ABUSE: ICD-10-CM

## 2018-09-24 DIAGNOSIS — F15.10 METHAMPHETAMINE ABUSE (H): ICD-10-CM

## 2018-09-24 LAB
ALBUMIN SERPL-MCNC: 4.9 G/DL (ref 3.4–5)
ALP SERPL-CCNC: 113 U/L (ref 40–150)
ALT SERPL W P-5'-P-CCNC: 50 U/L (ref 0–70)
AMPHETAMINES UR QL SCN: POSITIVE
ANION GAP SERPL CALCULATED.3IONS-SCNC: 12 MMOL/L (ref 3–14)
AST SERPL W P-5'-P-CCNC: 98 U/L (ref 0–45)
BARBITURATES UR QL: NEGATIVE
BASOPHILS # BLD AUTO: 0.1 10E9/L (ref 0–0.2)
BASOPHILS NFR BLD AUTO: 0.4 %
BENZODIAZ UR QL: NEGATIVE
BILIRUB SERPL-MCNC: 1.2 MG/DL (ref 0.2–1.3)
BUN SERPL-MCNC: 36 MG/DL (ref 7–30)
CALCIUM SERPL-MCNC: 9.7 MG/DL (ref 8.5–10.1)
CANNABINOIDS UR QL SCN: POSITIVE
CHLORIDE SERPL-SCNC: 109 MMOL/L (ref 94–109)
CO2 SERPL-SCNC: 22 MMOL/L (ref 20–32)
COCAINE UR QL: NEGATIVE
CREAT SERPL-MCNC: 1.63 MG/DL (ref 0.66–1.25)
CRP SERPL-MCNC: 19.8 MG/L (ref 0–8)
DIFFERENTIAL METHOD BLD: ABNORMAL
EOSINOPHIL # BLD AUTO: 0 10E9/L (ref 0–0.7)
EOSINOPHIL NFR BLD AUTO: 0.1 %
ERYTHROCYTE [DISTWIDTH] IN BLOOD BY AUTOMATED COUNT: 15.9 % (ref 10–15)
GFR SERPL CREATININE-BSD FRML MDRD: 47 ML/MIN/1.7M2
GLUCOSE SERPL-MCNC: 104 MG/DL (ref 70–99)
HCT VFR BLD AUTO: 46.7 % (ref 40–53)
HGB BLD-MCNC: 15.6 G/DL (ref 13.3–17.7)
IMM GRANULOCYTES # BLD: 0 10E9/L (ref 0–0.4)
IMM GRANULOCYTES NFR BLD: 0.2 %
LYMPHOCYTES # BLD AUTO: 2.5 10E9/L (ref 0.8–5.3)
LYMPHOCYTES NFR BLD AUTO: 18.4 %
MCH RBC QN AUTO: 25.2 PG (ref 26.5–33)
MCHC RBC AUTO-ENTMCNC: 33.4 G/DL (ref 31.5–36.5)
MCV RBC AUTO: 76 FL (ref 78–100)
METHADONE UR QL SCN: NEGATIVE
MONOCYTES # BLD AUTO: 1.5 10E9/L (ref 0–1.3)
MONOCYTES NFR BLD AUTO: 11.2 %
NEUTROPHILS # BLD AUTO: 9.5 10E9/L (ref 1.6–8.3)
NEUTROPHILS NFR BLD AUTO: 69.7 %
NRBC # BLD AUTO: 0 10*3/UL
NRBC BLD AUTO-RTO: 0 /100
OPIATES UR QL SCN: NEGATIVE
PCP UR QL SCN: NEGATIVE
PLATELET # BLD AUTO: 316 10E9/L (ref 150–450)
POTASSIUM SERPL-SCNC: 4 MMOL/L (ref 3.4–5.3)
PROT SERPL-MCNC: 9.1 G/DL (ref 6.8–8.8)
RBC # BLD AUTO: 6.18 10E12/L (ref 4.4–5.9)
SODIUM SERPL-SCNC: 143 MMOL/L (ref 133–144)
WBC # BLD AUTO: 13.6 10E9/L (ref 4–11)

## 2018-09-24 PROCEDURE — 99284 EMERGENCY DEPT VISIT MOD MDM: CPT | Mod: 25

## 2018-09-24 PROCEDURE — 96361 HYDRATE IV INFUSION ADD-ON: CPT

## 2018-09-24 PROCEDURE — 80307 DRUG TEST PRSMV CHEM ANLYZR: CPT | Performed by: EMERGENCY MEDICINE

## 2018-09-24 PROCEDURE — 85025 COMPLETE CBC W/AUTO DIFF WBC: CPT | Performed by: EMERGENCY MEDICINE

## 2018-09-24 PROCEDURE — 96374 THER/PROPH/DIAG INJ IV PUSH: CPT

## 2018-09-24 PROCEDURE — 86140 C-REACTIVE PROTEIN: CPT | Performed by: EMERGENCY MEDICINE

## 2018-09-24 PROCEDURE — 25000128 H RX IP 250 OP 636: Performed by: EMERGENCY MEDICINE

## 2018-09-24 PROCEDURE — 99284 EMERGENCY DEPT VISIT MOD MDM: CPT | Mod: Z6 | Performed by: EMERGENCY MEDICINE

## 2018-09-24 PROCEDURE — 36415 COLL VENOUS BLD VENIPUNCTURE: CPT | Performed by: EMERGENCY MEDICINE

## 2018-09-24 PROCEDURE — 80053 COMPREHEN METABOLIC PANEL: CPT | Performed by: EMERGENCY MEDICINE

## 2018-09-24 RX ORDER — ONDANSETRON 2 MG/ML
4 INJECTION INTRAMUSCULAR; INTRAVENOUS EVERY 30 MIN PRN
Status: DISCONTINUED | OUTPATIENT
Start: 2018-09-24 | End: 2018-09-24 | Stop reason: HOSPADM

## 2018-09-24 RX ORDER — SODIUM CHLORIDE 9 MG/ML
1000 INJECTION, SOLUTION INTRAVENOUS CONTINUOUS
Status: DISCONTINUED | OUTPATIENT
Start: 2018-09-24 | End: 2018-09-24 | Stop reason: HOSPADM

## 2018-09-24 RX ORDER — ACETAMINOPHEN 325 MG/1
650 TABLET ORAL ONCE
Status: DISCONTINUED | OUTPATIENT
Start: 2018-09-24 | End: 2018-09-24 | Stop reason: HOSPADM

## 2018-09-24 RX ADMIN — ONDANSETRON 4 MG: 2 INJECTION, SOLUTION INTRAMUSCULAR; INTRAVENOUS at 05:58

## 2018-09-24 RX ADMIN — SODIUM CHLORIDE 1000 ML: 9 INJECTION, SOLUTION INTRAVENOUS at 05:58

## 2018-09-24 NOTE — ED NOTES
Patient wanting something to drink. Let him know he needs to be NPO until Dr. Avitia is able to get in to complete his assessment. Somewhat agitated.
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
## 2018-09-24 NOTE — ED AVS SNAPSHOT
[facility]    [address]    [place] [address]    Phone:  [phone]                                       Dick Valdovinos   MRN: [id number]    Department:  [facility]   Date of Visit:  9/24/2018           Patient Information     Date Of Birth          1975        Your diagnoses for this visit were:     Methamphetamine abuse     Cannabis abuse     Intractable vomiting with nausea, unspecified vomiting type        You were seen by Clifford Avitia MD.        Discharge Instructions         Addiction: Your Treatment Options  No single treatment for addiction works for everyone. The treatment that's best for you can depend on many factors. For many people, treatment may be a combination of medicine, behavior change, therapy, lifestyle changes, and support.  Medicines  Medicines can help with withdrawal symptoms. They can also reduce cravings for the addictive substance. They can blunt its feel-good effects. For example:    Methadone, buprenorphine, and naltrexone are used for heroin and other opioid addiction.    Acamprosate, disulfiram, and naltrexone are used for treating alcohol addiction.    Bupropion, varenicline, or nicotine replacement therapy can help with nicotine addiction.  These medicines have proved to be quite helpful for people trying to overcome an addiction.    Behavior change treatment    Motivational Interviewing. This is a type of counseling that encourages you to change your behavior. The goal is to explore and resolve any mixed feelings you have about quitting drug or alcohol use. The therapist helps you figure out and focus on your personal reasons for wanting to change.      Cognitive behavioral therapy (CBT). In this therapy, you figure out your problem behaviors. And you learn ways to change those behaviors. For example, if anger or stress makes you want to drink, a therapist can help you learn healthy ways to manage those feelings.    [facility]  reinforcement approach (CRA). This therapy uses vouchers help you follow a drug- or alcohol-free lifestyle.  With each clean urine sample, you get a voucher to use for a reward.  This helps you stay drug- or alcohol-free while you learn new life skills.    Community reinforcement and family training (CRAFT). This therapy counsels and trains your family. The therapist teaches them how to motivate you to seek or continue treatment. This therapy also helps your family recognize family situations that may encourage you to drink or use drugs.     [place] support groups. These groups are run voluntarily by non-health care professional people. Their purpose is to support each other emotionally and socially by sharing their experiences with substance abuse and mentoring others through the recovery process. Many of these groups are based on the 12-step recovery model, and have sessions available for every type of addiction (for example, AA or Alcoholics Anonymous; NA or Narcotics Anonymous).    Individualized drug counseling (IDC). This commonly practiced form of therapy typically incorporate the disease model of addiction and the spiritual dimension of recovery while also focusing on behavioral change through participation in 12-step programs.  Date Last Reviewed: 2/1/2017 2000-2017 [employer]. [address] [place] [address]. All rights reserved. This information is not intended as a substitute for professional medical care. Always follow your healthcare professional's instructions.             Review of your medicines      Our records show that you are taking the medicines listed below. If these are incorrect, please call your family doctor or clinic.        Dose / Directions Last dose taken    ACETAMINOPHEN PO   Dose:  1000 mg        Take 1,000 mg by mouth   Refills:  0        albuterol 108 (90 Base) MCG/ACT inhaler   Commonly known as:  PROAIR HFA/PROVENTIL HFA/VENTOLIN HFA   Dose:  2 puff      "   Inhale 2 puffs into the lungs every 4 hours as needed for shortness of breath / dyspnea or wheezing   Refills:  0        BUSPAR PO   Dose:  10 mg        Take 10 mg by mouth 3 times daily   Refills:  0        LAMICTAL PO   Dose:  50 mg        Take 50 mg by mouth 2 times daily   Refills:  0        OMEPRAZOLE PO   Dose:  40 mg        Take 40 mg by mouth every morning   Refills:  0                Procedures and tests performed during your visit     CBC with platelets differential    CRP inflammation    Comprehensive metabolic panel    Drug Screen Urine (Range)    Peripheral IV catheter      Orders Needing Specimen Collection     None      Pending Results     No orders found from 2018 to 2018.            Pending Culture Results     No orders found from 2018 to 2018.            Thank you for choosing [facility]       Thank you for choosing [facility] for your care. Our goal is always to provide you with excellent care. Hearing back from our patients is one way we can continue to improve our services. Please take a few minutes to complete the written survey that you may receive in the mail after you visit with us. Thank you!        [employer] Information     [employer] lets you send messages to your doctor, view your test results, renew your prescriptions, schedule appointments and more. To sign up, go to [email or web address] . Click on \"Log in\" on the left side of the screen, which will take you to the Welcome page. Then click on \"Sign up Now\" on the right side of the page.     You will be asked to enter the access code listed below, as well as some personal information. Please follow the directions to create your username and password.     Your access code is: [email or web address]  Expires: 2018  8:12 AM     Your access code will  in 90 days. If you need help or a new code, please call your [facility] clinic or [phone].        Care EveryWhere ID     This is your Care EveryWhere ID. This could be " used by other organizations to access your [facility] medical records  [id number]        Equal Access to Services     GEETHA FIGUEREDO : Reina Salcido, malik clinton, jarad gupta, niki solis. So [facility] [phone].    ATENCIÓN: Si habla español, tiene a baxter disposición servicios gratuitos de asistencia lingüística. Llame al [phone].    We comply with applicable federal civil rights laws and [place] laws. We do not discriminate on the basis of race, color, national origin, age, disability, sex, sexual orientation, or gender identity.            After Visit Summary       This is your record. Keep this with you and show to your community pharmacist(s) and doctor(s) at your next visit.

## 2018-09-24 NOTE — ED AVS SNAPSHOT
[facility]    [address] [place] [address]    Phone:  [phone]                                       Dick Valdovinos   MRN: [id number]    Department:  [facility]   Date of Visit:  9/24/2018           After Visit Summary Signature Page     I have received my discharge instructions, and my questions have been answered. I have discussed any challenges I see with this plan with the nurse or doctor.    ..........................................................................................................................................  Patient/Patient Representative Signature      ..........................................................................................................................................  Patient Representative Print Name and Relationship to Patient    ..................................................               ................................................  Date                                   Time    ..........................................................................................................................................  Reviewed by Signature/Title    ...................................................              ..............................................  Date                                               Time          22EPIC Rev 08/18

## 2018-09-24 NOTE — DISCHARGE INSTRUCTIONS
Addiction: Your Treatment Options  No single treatment for addiction works for everyone. The treatment that's best for you can depend on many factors. For many people, treatment may be a combination of medicine, behavior change, therapy, lifestyle changes, and support.  Medicines  Medicines can help with withdrawal symptoms. They can also reduce cravings for the addictive substance. They can blunt its feel-good effects. For example:    Methadone, buprenorphine, and naltrexone are used for heroin and other opioid addiction.    Acamprosate, disulfiram, and naltrexone are used for treating alcohol addiction.    Bupropion, varenicline, or nicotine replacement therapy can help with nicotine addiction.  These medicines have proved to be quite helpful for people trying to overcome an addiction.    Behavior change treatment    Motivational Interviewing. This is a type of counseling that encourages you to change your behavior. The goal is to explore and resolve any mixed feelings you have about quitting drug or alcohol use. The therapist helps you figure out and focus on your personal reasons for wanting to change.      Cognitive behavioral therapy (CBT). In this therapy, you figure out your problem behaviors. And you learn ways to change those behaviors. For example, if anger or stress makes you want to drink, a therapist can help you learn healthy ways to manage those feelings.    Community reinforcement approach (CRA). This therapy uses vouchers help you follow a drug- or alcohol-free lifestyle.  With each clean urine sample, you get a voucher to use for a reward.  This helps you stay drug- or alcohol-free while you learn new life skills.    Community reinforcement and family training (CRAFT). This therapy counsels and trains your family. The therapist teaches them how to motivate you to seek or continue treatment. This therapy also helps your family recognize family situations that may encourage you to drink or use  drugs.     [place] support groups. These groups are run voluntarily by non-health care professional people. Their purpose is to support each other emotionally and socially by sharing their experiences with substance abuse and mentoring others through the recovery process. Many of these groups are based on the 12-step recovery model, and have sessions available for every type of addiction (for example, AA or Alcoholics Anonymous; NA or Narcotics Anonymous).    Individualized drug counseling (IDC). This commonly practiced form of therapy typically incorporate the disease model of addiction and the spiritual dimension of recovery while also focusing on behavioral change through participation in 12-step programs.  Date Last Reviewed: 2/1/2017 2000-2017 [employer]. [address] [place] [address], [place] [address]. All rights reserved. This information is not intended as a substitute for professional medical care. Always follow your healthcare professional's instructions.

## 2018-12-12 ENCOUNTER — OFFICE VISIT (OUTPATIENT)
Dept: FAMILY MEDICINE | Facility: OTHER | Age: 43
End: 2018-12-12
Attending: NURSE PRACTITIONER

## 2018-12-12 VITALS
BODY MASS INDEX: 29.88 KG/M2 | HEART RATE: 64 BPM | TEMPERATURE: 97.8 F | HEIGHT: 68 IN | DIASTOLIC BLOOD PRESSURE: 76 MMHG | SYSTOLIC BLOOD PRESSURE: 130 MMHG | OXYGEN SATURATION: 95 % | WEIGHT: 197.13 LBS

## 2018-12-12 DIAGNOSIS — K21.00 GASTROESOPHAGEAL REFLUX DISEASE WITH ESOPHAGITIS: ICD-10-CM

## 2018-12-12 DIAGNOSIS — F15.20 METHAMPHETAMINE ADDICTION (H): ICD-10-CM

## 2018-12-12 DIAGNOSIS — F33.2 SEVERE EPISODE OF RECURRENT MAJOR DEPRESSIVE DISORDER, WITHOUT PSYCHOTIC FEATURES (H): ICD-10-CM

## 2018-12-12 DIAGNOSIS — Z00.00 ROUTINE HISTORY AND PHYSICAL EXAMINATION OF ADULT: ICD-10-CM

## 2018-12-12 DIAGNOSIS — Z23 NEED FOR TETANUS, DIPHTHERIA, AND ACELLULAR PERTUSSIS (TDAP) VACCINE: Primary | ICD-10-CM

## 2018-12-12 LAB
ALBUMIN SERPL-MCNC: 4.1 G/DL (ref 3.5–5.7)
ALP SERPL-CCNC: 71 U/L (ref 34–104)
ALT SERPL W P-5'-P-CCNC: 24 U/L (ref 7–52)
ANION GAP SERPL CALCULATED.3IONS-SCNC: 5 MMOL/L (ref 3–14)
AST SERPL W P-5'-P-CCNC: 21 U/L (ref 13–39)
BILIRUB SERPL-MCNC: 0.2 MG/DL (ref 0.3–1)
BUN SERPL-MCNC: 13 MG/DL (ref 7–25)
CALCIUM SERPL-MCNC: 9.3 MG/DL (ref 8.6–10.3)
CHLORIDE SERPL-SCNC: 106 MMOL/L (ref 98–107)
CO2 SERPL-SCNC: 29 MMOL/L (ref 21–31)
CREAT SERPL-MCNC: 0.93 MG/DL (ref 0.7–1.3)
ERYTHROCYTE [DISTWIDTH] IN BLOOD BY AUTOMATED COUNT: 15.5 % (ref 10–15)
GFR SERPL CREATININE-BSD FRML MDRD: 89 ML/MIN/1.7M2
GLUCOSE SERPL-MCNC: 97 MG/DL (ref 70–105)
HCT VFR BLD AUTO: 43.3 % (ref 40–53)
HGB BLD-MCNC: 14.1 G/DL (ref 13.3–17.7)
MCH RBC QN AUTO: 25.5 PG (ref 26.5–33)
MCHC RBC AUTO-ENTMCNC: 32.6 G/DL (ref 31.5–36.5)
MCV RBC AUTO: 78 FL (ref 78–100)
PLATELET # BLD AUTO: 327 10E9/L (ref 150–450)
POTASSIUM SERPL-SCNC: 3.9 MMOL/L (ref 3.5–5.1)
PROT SERPL-MCNC: 7.3 G/DL (ref 6.4–8.9)
RBC # BLD AUTO: 5.52 10E12/L (ref 4.4–5.9)
SODIUM SERPL-SCNC: 140 MMOL/L (ref 134–144)
WBC # BLD AUTO: 7.2 10E9/L (ref 4–11)

## 2018-12-12 PROCEDURE — 99396 PREV VISIT EST AGE 40-64: CPT | Mod: 25 | Performed by: NURSE PRACTITIONER

## 2018-12-12 PROCEDURE — 36415 COLL VENOUS BLD VENIPUNCTURE: CPT | Performed by: NURSE PRACTITIONER

## 2018-12-12 PROCEDURE — 90715 TDAP VACCINE 7 YRS/> IM: CPT | Performed by: NURSE PRACTITIONER

## 2018-12-12 PROCEDURE — 90471 IMMUNIZATION ADMIN: CPT | Performed by: NURSE PRACTITIONER

## 2018-12-12 PROCEDURE — 85027 COMPLETE CBC AUTOMATED: CPT | Performed by: NURSE PRACTITIONER

## 2018-12-12 PROCEDURE — 80053 COMPREHEN METABOLIC PANEL: CPT | Performed by: NURSE PRACTITIONER

## 2018-12-12 RX ORDER — PROPRANOLOL HYDROCHLORIDE 20 MG/1
1 TABLET ORAL 3 TIMES DAILY
COMMUNITY
Start: 2018-11-29 | End: 2021-06-20

## 2018-12-12 RX ORDER — BUSPIRONE HYDROCHLORIDE 10 MG/1
1 TABLET ORAL 3 TIMES DAILY
Refills: 3 | COMMUNITY
Start: 2018-10-25 | End: 2018-12-19

## 2018-12-12 RX ORDER — LAMOTRIGINE 25 MG/1
TABLET ORAL
Refills: 0 | COMMUNITY
Start: 2018-10-23 | End: 2021-06-20

## 2018-12-12 ASSESSMENT — MIFFLIN-ST. JEOR: SCORE: 1772.62

## 2018-12-12 ASSESSMENT — PATIENT HEALTH QUESTIONNAIRE - PHQ9: SUM OF ALL RESPONSES TO PHQ QUESTIONS 1-9: 25

## 2018-12-12 ASSESSMENT — PAIN SCALES - GENERAL: PAINLEVEL: NO PAIN (0)
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
## 2018-12-12 NOTE — NURSING NOTE
"Chief Complaint   Patient presents with     Physical     Refill Request     all meds         Initial /76   Pulse 64   Temp 97.8  F (36.6  C) (Tympanic)   Ht 1.734 m (5' 8.25\")   Wt 89.4 kg (197 lb 2 oz)   SpO2 95%   BMI 29.75 kg/m   Estimated body mass index is 29.75 kg/m  as calculated from the following:    Height as of this encounter: 1.734 m (5' 8.25\").    Weight as of this encounter: 89.4 kg (197 lb 2 oz).    Medication Reconciliation: complete      Norma J. Gosselin, LPN  "

## 2018-12-19 ENCOUNTER — TELEPHONE (OUTPATIENT)
Dept: FAMILY MEDICINE | Facility: OTHER | Age: 43
End: 2018-12-19

## 2018-12-19 DIAGNOSIS — F33.2 SEVERE EPISODE OF RECURRENT MAJOR DEPRESSIVE DISORDER, WITHOUT PSYCHOTIC FEATURES (H): Primary | ICD-10-CM

## 2018-12-19 RX ORDER — BUSPIRONE HYDROCHLORIDE 10 MG/1
TABLET ORAL
Qty: 90 TABLET | Refills: 0 | Status: SHIPPED | OUTPATIENT
Start: 2018-12-19 | End: 2021-06-20

## 2018-12-19 NOTE — TELEPHONE ENCOUNTER
States that pt would like to get on his mental health meds. Discussed at last appointment and pt has now decided he wants to take them.

## 2021-06-19 VITALS
OXYGEN SATURATION: 97 % | RESPIRATION RATE: 18 BRPM | DIASTOLIC BLOOD PRESSURE: 80 MMHG | SYSTOLIC BLOOD PRESSURE: 138 MMHG | TEMPERATURE: 97.4 F | HEART RATE: 92 BPM

## 2021-06-19 PROCEDURE — 999N000104 HC STATISTIC NO CHARGE

## 2021-06-20 ENCOUNTER — HOSPITAL ENCOUNTER (EMERGENCY)
Facility: HOSPITAL | Age: 46
Discharge: HOME | End: 2021-06-20
Attending: NURSE PRACTITIONER | Admitting: NURSE PRACTITIONER
Payer: COMMERCIAL

## 2021-06-20 ENCOUNTER — HOSPITAL ENCOUNTER (EMERGENCY)
Facility: HOSPITAL | Age: 46
Discharge: LEFT BEFORE DISCHARGE | End: 2021-06-20
Attending: EMERGENCY MEDICINE | Admitting: EMERGENCY MEDICINE
Payer: COMMERCIAL

## 2021-06-20 ENCOUNTER — APPOINTMENT (OUTPATIENT)
Dept: GENERAL RADIOLOGY | Facility: HOSPITAL | Age: 46
End: 2021-06-20
Attending: NURSE PRACTITIONER
Payer: COMMERCIAL

## 2021-06-20 VITALS
OXYGEN SATURATION: 99 % | TEMPERATURE: 97.6 F | HEART RATE: 78 BPM | SYSTOLIC BLOOD PRESSURE: 131 MMHG | RESPIRATION RATE: 16 BRPM | DIASTOLIC BLOOD PRESSURE: 87 MMHG

## 2021-06-20 DIAGNOSIS — Z53.21 PATIENT LEFT WITHOUT BEING SEEN: ICD-10-CM

## 2021-06-20 DIAGNOSIS — S81.812A LACERATION OF LEG, LEFT, INITIAL ENCOUNTER: ICD-10-CM

## 2021-06-20 PROCEDURE — 250N000013 HC RX MED GY IP 250 OP 250 PS 637: Performed by: NURSE PRACTITIONER

## 2021-06-20 PROCEDURE — 999N000104 HC STATISTIC NO CHARGE

## 2021-06-20 PROCEDURE — 12034 INTMD RPR S/TR/EXT 7.6-12.5: CPT | Performed by: NURSE PRACTITIONER

## 2021-06-20 PROCEDURE — 12034 INTMD RPR S/TR/EXT 7.6-12.5: CPT

## 2021-06-20 PROCEDURE — 12004 RPR S/N/AX/GEN/TRK7.6-12.5CM: CPT

## 2021-06-20 PROCEDURE — 73590 X-RAY EXAM OF LOWER LEG: CPT | Mod: LT

## 2021-06-20 RX ORDER — CEPHALEXIN 500 MG/1
500 CAPSULE ORAL ONCE
Status: COMPLETED | OUTPATIENT
Start: 2021-06-20 | End: 2021-06-20

## 2021-06-20 RX ORDER — CEPHALEXIN 500 MG/1
500 CAPSULE ORAL 4 TIMES DAILY
Qty: 27 CAPSULE | Refills: 0 | Status: SHIPPED | OUTPATIENT
Start: 2021-06-20 | End: 2023-07-30

## 2021-06-20 RX ADMIN — CEPHALEXIN 500 MG: 500 CAPSULE ORAL at 20:02
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
## 2021-06-20 NOTE — ED PROVIDER NOTES
History     Chief Complaint   Patient presents with     Laceration     HPI  Dick Valdovinos is a 45 year old male who LEFT WITHOUT BEING SEEN, NO NOTE WILL BE WRITTEN.    Allergies:  Allergies   Allergen Reactions     Bactrim [Sulfamethoxazole W/Trimethoprim] Hives     Codeine Hives and GI Disturbance     Metal [Staples] Hives     Zithromax [Azithromycin] Hives       Problem List:    Patient Active Problem List    Diagnosis Date Noted     Methamphetamine addiction (H) 12/12/2018     Priority: Medium     Currently in voluntary treatment       Gastroesophageal reflux disease with esophagitis 12/12/2018     Priority: Medium     Severe episode of recurrent major depressive disorder, without psychotic features (H) 12/12/2018     Priority: Medium        Past Medical History:    Past Medical History:   Diagnosis Date     Anxiety      Bipolar disorder (H)      Depressive disorder      PTSD (post-traumatic stress disorder) 2013     Uncomplicated asthma        Past Surgical History:    Past Surgical History:   Procedure Laterality Date     ARTHROSCOPY SHOULDER ROTATOR CUFF REPAIR Left 4/17/2018    Procedure: ARTHROSCOPY SHOULDER ROTATOR CUFF REPAIR;  LEFT SHOULDER ARTHROSCOPY, ROTATOR CUFF REPAIR;  Surgeon: Amanuel Matthews MD;  Location: [facility]     EYE SURGERY       ORTHOPEDIC SURGERY         Family History:    Family History   Problem Relation Age of Onset     Emphysema Mother      Alcoholism Mother      Alcoholism Brother      Anxiety Disorder Sister      Post-Traumatic Stress Disorder (PTSD) Sister      Alcoholism Sister        Social History:  Marital Status:  Single [1]  Social History     Tobacco Use     Smoking status: Current Every Day Smoker     Packs/day: 0.50     Years: 30.00     Pack years: 15.00     Types: Cigarettes     Smokeless tobacco: Never Used   Substance Use Topics     Alcohol use: Not Currently     Comment: occasional use      Drug use: Yes     Frequency: 5.0 times per week     Types: Marijuana, IV      Comment: no meth in years        Medications:    albuterol (PROAIR HFA/PROVENTIL HFA/VENTOLIN HFA) 108 (90 Base) MCG/ACT Inhaler  busPIRone (BUSPAR) 10 MG tablet  lamoTRIgine (LAMICTAL) 25 MG tablet  OMEPRAZOLE PO  propranolol (INDERAL) 20 MG tablet          Review of Systems    Physical Exam   BP: 138/80  Pulse: 92  Temp: 97.4  F (36.3  C)  Resp: 18  SpO2: 97 %      Physical Exam    ED Course        Procedures            Critical Care time:               No results found for this or any previous visit (from the past 24 hour(s)).    Medications - No data to display    Assessments & Plan (with Medical Decision Making)     I have reviewed the nursing notes.    I have reviewed the findings, diagnosis, plan and need for follow up with the patient.    New Prescriptions    No medications on file       Final diagnoses:   Patient left without being seen       6/19/2021   [facility]     Rodger Melvin MD  06/20/21 0023
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
## 2021-06-21 NOTE — DISCHARGE INSTRUCTIONS
antibiotic tomorrow from [facility] in the morning.  Complete all as directed.    Keep wound clean and dry.  Once a day apply over-the-counter topical antibiotic, cover with a nonstick dressing, wrap with a dressing roll to keep dressing in place.  If wound gets wet or dirty, perform this more frequently.    Do not submerge underwater.    May use ice, Tylenol and ibuprofen as needed for pain and swelling.    Call clinic tomorrow to schedule follow-up appointment the next 2 to 3 days.  If you notice any redness, swelling, warmth, color drainage, fever, increased pain, etc.,  Seek immediate medical care.

## 2022-03-15 ENCOUNTER — MEDICAL CORRESPONDENCE (OUTPATIENT)
Dept: MRI IMAGING | Facility: HOSPITAL | Age: 47
End: 2022-03-15
Payer: COMMERCIAL

## 2022-07-26 ENCOUNTER — MEDICAL CORRESPONDENCE (OUTPATIENT)
Dept: MRI IMAGING | Facility: HOSPITAL | Age: 47
End: 2022-07-26

## 2022-08-12 ENCOUNTER — HOSPITAL ENCOUNTER (OUTPATIENT)
Dept: MRI IMAGING | Facility: HOSPITAL | Age: 47
Discharge: HOME | End: 2022-08-12
Attending: FAMILY MEDICINE
Payer: COMMERCIAL

## 2022-08-12 DIAGNOSIS — M25.519 PAIN IN UNSPECIFIED SHOULDER: ICD-10-CM

## 2022-08-12 DIAGNOSIS — M25.462 EFFUSION, LEFT KNEE: ICD-10-CM

## 2022-08-12 PROCEDURE — 73721 MRI JNT OF LWR EXTRE W/O DYE: CPT | Mod: LT

## 2022-08-12 PROCEDURE — 73221 MRI JOINT UPR EXTREM W/O DYE: CPT | Mod: RT

## 2023-07-30 ENCOUNTER — HOSPITAL ENCOUNTER (EMERGENCY)
Facility: HOSPITAL | Age: 48
Discharge: HOME | End: 2023-07-30
Attending: PHYSICIAN ASSISTANT | Admitting: PHYSICIAN ASSISTANT
Payer: COMMERCIAL

## 2023-07-30 VITALS
DIASTOLIC BLOOD PRESSURE: 82 MMHG | OXYGEN SATURATION: 98 % | SYSTOLIC BLOOD PRESSURE: 132 MMHG | HEART RATE: 76 BPM | TEMPERATURE: 97.4 F | RESPIRATION RATE: 16 BRPM

## 2023-07-30 DIAGNOSIS — K04.7 DENTAL INFECTION: ICD-10-CM

## 2023-07-30 PROCEDURE — G0463 HOSPITAL OUTPT CLINIC VISIT: HCPCS

## 2023-07-30 PROCEDURE — 99213 OFFICE O/P EST LOW 20 MIN: CPT | Performed by: PHYSICIAN ASSISTANT

## 2023-07-30 RX ORDER — ALBUTEROL SULFATE 90 UG/1
2 AEROSOL, METERED RESPIRATORY (INHALATION) 4 TIMES DAILY
COMMUNITY
Start: 2023-01-18

## 2023-07-30 NOTE — ED PROVIDER NOTES
History     Chief Complaint   Patient presents with    Dental Pain     HPI  Dick Valdovinos is a 47 year old male who presents with right upper dental pain x 2 days. Awoke with some right sided facial swelling today. No drainage. No fevers/chills. Painful to chew on that side.     Allergies:  Allergies   Allergen Reactions    Bactrim [Sulfamethoxazole-Trimethoprim] Hives    Codeine Hives and GI Disturbance    Metal [Staples] Hives    Zithromax [Azithromycin] Hives       Problem List:    Patient Active Problem List    Diagnosis Date Noted    Methamphetamine addiction (H) 12/12/2018     Priority: Medium     Currently in voluntary treatment      Gastroesophageal reflux disease with esophagitis 12/12/2018     Priority: Medium    Severe episode of recurrent major depressive disorder, without psychotic features (H) 12/12/2018     Priority: Medium        Past Medical History:    Past Medical History:   Diagnosis Date    Anxiety     Bipolar disorder (H)     Depressive disorder     PTSD (post-traumatic stress disorder) 2013    Uncomplicated asthma        Past Surgical History:    Past Surgical History:   Procedure Laterality Date    ARTHROSCOPY SHOULDER ROTATOR CUFF REPAIR Left 4/17/2018    Procedure: ARTHROSCOPY SHOULDER ROTATOR CUFF REPAIR;  LEFT SHOULDER ARTHROSCOPY, ROTATOR CUFF REPAIR;  Surgeon: Amanuel Matthews MD;  Location: [facility]    EYE SURGERY      ORTHOPEDIC SURGERY         Family History:    Family History   Problem Relation Age of Onset    Emphysema Mother     Alcoholism Mother     Alcoholism Brother     Anxiety Disorder Sister     Post-Traumatic Stress Disorder (PTSD) Sister     Alcoholism Sister        Social History:  Marital Status:   [2]  Social History     Tobacco Use    Smoking status: Every Day     Packs/day: 0.50     Years: 30.00     Pack years: 15.00     Types: Cigarettes    Smokeless tobacco: Never   Substance Use Topics    Alcohol use: Not Currently     Comment: occasional use     Drug use:  Yes     Frequency: 5.0 times per week     Types: Marijuana, IV     Comment: no meth in years        Medications:    amoxicillin-clavulanate (AUGMENTIN) 875-125 MG tablet  VENTOLIN  (90 Base) MCG/ACT inhaler          Review of Systems   All other systems reviewed and are negative.      Physical Exam   BP: 132/82  Pulse: 76  Temp: 97.4  F (36.3  C)  Resp: 16  SpO2: 98 %      Physical Exam  Vitals and nursing note reviewed.   Constitutional:       Appearance: Normal appearance.   HENT:      Head: Normocephalic and atraumatic.        Mouth/Throat:      Dentition: Abnormal dentition. Dental tenderness, gingival swelling and dental caries present. No dental abscesses.      Pharynx: Oropharynx is clear.      Tonsils: No tonsillar exudate or tonsillar abscesses. 0 on the right. 0 on the left.     Cardiovascular:      Rate and Rhythm: Normal rate and regular rhythm.      Pulses: Normal pulses.      Heart sounds: Normal heart sounds.   Pulmonary:      Effort: Pulmonary effort is normal.      Breath sounds: Normal breath sounds.   Skin:     General: Skin is warm.      Capillary Refill: Capillary refill takes less than 2 seconds.   Neurological:      Mental Status: He is alert.         ED Course                 Procedures            No results found for this or any previous visit (from the past 24 hour(s)).    Medications - No data to display    Assessments & Plan (with Medical Decision Making)   Findings consistent with left upper dental infection. No fluctuance to suggest drainable abscess. No trismus. Afebrile. RX for Augmentin was prescribed. Pt was discharged home following in stable condition.     Plan:  Take the Augmentin as prescribed for your dental infection.   Take Ibuprofen as directed for pain.   Follow up with a dentist asap.   Return here sooner with any new or worsening symptoms.     I have reviewed the nursing notes.    I have reviewed the findings, diagnosis, plan and need for follow up with the  patient.    New Prescriptions    AMOXICILLIN-CLAVULANATE (AUGMENTIN) 875-125 MG TABLET    Take 1 tablet by mouth 2 times daily for 7 days       Final diagnoses:   Dental infection       7/30/2023   [facility]

## 2023-07-30 NOTE — DISCHARGE INSTRUCTIONS
Take the Augmentin as prescribed for your dental infection.   Take Ibuprofen as directed for pain.   Follow up with a dentist asap.   Return here sooner with any new or worsening symptoms.

## 2023-07-30 NOTE — ED TRIAGE NOTES
Patient presents to urgent care for possible infection to the front left upper tooth. Patient is looking for an antibiotic. Patient states he doesn't have a dentist.  Dental list given today at this appointment.

## 2023-07-31 ENCOUNTER — HOSPITAL ENCOUNTER (EMERGENCY)
Facility: HOSPITAL | Age: 48
Discharge: HOME | End: 2023-07-31
Payer: COMMERCIAL

## 2023-07-31 VITALS
HEART RATE: 73 BPM | TEMPERATURE: 98.6 F | RESPIRATION RATE: 16 BRPM | DIASTOLIC BLOOD PRESSURE: 83 MMHG | OXYGEN SATURATION: 98 % | SYSTOLIC BLOOD PRESSURE: 124 MMHG

## 2023-07-31 DIAGNOSIS — K04.7 DENTAL ABSCESS: ICD-10-CM

## 2023-07-31 PROCEDURE — 99213 OFFICE O/P EST LOW 20 MIN: CPT

## 2023-07-31 PROCEDURE — G0463 HOSPITAL OUTPT CLINIC VISIT: HCPCS

## 2023-07-31 ASSESSMENT — ENCOUNTER SYMPTOMS
CHILLS: 0
FEVER: 0
TROUBLE SWALLOWING: 0
FACIAL SWELLING: 1
SORE THROAT: 0
SHORTNESS OF BREATH: 0

## 2023-07-31 ASSESSMENT — ACTIVITIES OF DAILY LIVING (ADL): ADLS_ACUITY_SCORE: 35

## 2023-07-31 NOTE — ED TRIAGE NOTES
Patient presents to urgent care for dental pain on the upper left sided that he was re-evaluated. He was here yesterday and started on an antibiotic. Patient reports that he feels though the infection is spreading and its getting close to his eyes.

## 2023-07-31 NOTE — ED PROVIDER NOTES
History     Chief Complaint   Patient presents with    Dental Pain     HPI  Dick Valdovinos is a 47 year old male who presents to urgent care for recheck of dental abscess.  Was evaluated in urgent care yesterday for this complaint, diagnosed with dental abscess and treated with Augmentin.  Reports compliance with Augmentin without side effects.  He is concerned the infection may be worsening today due to increased facial swelling.  Pain has remained stable.  Denies fevers, chills, malaise, difficulty swallowing, difficulty breathing, drainage, discharge, foul taste in mouth.    Allergies:  Allergies   Allergen Reactions    Bactrim [Sulfamethoxazole-Trimethoprim] Hives    Codeine Hives and GI Disturbance    Metal [Staples] Hives    Zithromax [Azithromycin] Hives       Problem List:    Patient Active Problem List    Diagnosis Date Noted    Methamphetamine addiction (H) 12/12/2018     Priority: Medium     Currently in voluntary treatment      Gastroesophageal reflux disease with esophagitis 12/12/2018     Priority: Medium    Severe episode of recurrent major depressive disorder, without psychotic features (H) 12/12/2018     Priority: Medium        Past Medical History:    Past Medical History:   Diagnosis Date    Anxiety     Bipolar disorder (H)     Depressive disorder     PTSD (post-traumatic stress disorder) 2013    Uncomplicated asthma        Past Surgical History:    Past Surgical History:   Procedure Laterality Date    ARTHROSCOPY SHOULDER ROTATOR CUFF REPAIR Left 4/17/2018    Procedure: ARTHROSCOPY SHOULDER ROTATOR CUFF REPAIR;  LEFT SHOULDER ARTHROSCOPY, ROTATOR CUFF REPAIR;  Surgeon: Amanuel Matthews MD;  Location: [facility]    EYE SURGERY      ORTHOPEDIC SURGERY         Family History:    Family History   Problem Relation Age of Onset    Emphysema Mother     Alcoholism Mother     Alcoholism Brother     Anxiety Disorder Sister     Post-Traumatic Stress Disorder (PTSD) Sister     Alcoholism Sister        Social  History:  Marital Status:   [2]  Social History     Tobacco Use    Smoking status: Every Day     Packs/day: 0.50     Years: 30.00     Pack years: 15.00     Types: Cigarettes    Smokeless tobacco: Never   Substance Use Topics    Alcohol use: Not Currently     Comment: occasional use     Drug use: Yes     Frequency: 5.0 times per week     Types: Marijuana, IV     Comment: no meth in years        Medications:    amoxicillin-clavulanate (AUGMENTIN) 875-125 MG tablet  VENTOLIN  (90 Base) MCG/ACT inhaler          Review of Systems   Constitutional:  Negative for chills and fever.   HENT:  Positive for dental problem and facial swelling. Negative for sore throat and trouble swallowing.    Respiratory:  Negative for shortness of breath.    All other systems reviewed and are negative.      Physical Exam   BP: 124/83  Pulse: 73  Temp: 98.6  F (37  C)  Resp: 16  SpO2: 98 %      Physical Exam  Constitutional:       General: He is not in acute distress.     Appearance: He is not ill-appearing.   HENT:      Head:      Comments: Tenderness with palpation over left maxillary region with appreciable firm edema without fluctuance.  Mild overlying edema without erythema.     Mouth/Throat:      Mouth: Mucous membranes are moist.      Pharynx: No oropharyngeal exudate or posterior oropharyngeal erythema.      Comments: Poor dentition throughout.  Partially fractured left upper canine.  No significant surrounding erythema or edema.  No purulence, drainage, discharge.  Uvula is midline.  No submandibular edema.  Cardiovascular:      Rate and Rhythm: Normal rate and regular rhythm.   Pulmonary:      Effort: Pulmonary effort is normal.   Skin:     General: Skin is warm and dry.   Neurological:      Mental Status: He is alert.         ED Course                 Procedures             Critical Care time:               No results found for this or any previous visit (from the past 24 hour(s)).    Medications - No data to  display    Assessments & Plan (with Medical Decision Making)     History and physical exam is consistent with unchanging dental abscess. Previously prescribed antibiotics are appropriate, he reports compliance without side effects.  Exam does not support peritonsillar abscess, Carloz's angina.  Plan is to continue with previous treatment recommendations.  Augmentin twice daily for 7 days total.  Tylenol and ibuprofen as needed for pain/discomfort.  Frequent icing of face for swelling/discomfort.  Strongly urged him to follow-up with dental.  If symptoms persist or worsen return to urgent care.    I have reviewed the nursing notes.    I have reviewed the findings, diagnosis, plan and need for follow up with the patient.          Medical Decision Making  The patient's presentation was of .    The patient's evaluation involved:      The patient's management necessitated .        Discharge Medication List as of 7/31/2023  5:23 PM          Final diagnoses:   Dental abscess       7/31/2023   [facility]

## 2023-07-31 NOTE — ED TRIAGE NOTES
"Patient presents with c/o dental pain that he was evaluated for yesterday, started on ABX. Patient reports he feels as if the infection is spreading and concerned because it is \"so close to my eye.\" Patient report upper left sided dental pain/swelling.         "

## 2023-07-31 NOTE — DISCHARGE INSTRUCTIONS
Exam today is reassuring.  Continue with Augmentin twice daily as previously prescribed.  Tylenol ibuprofen as needed for pain/discomfort.  Frequent icing of the face can help with swelling and discomfort.  If symptoms persist or worsen return to urgent care.  Follow-up with dental as previously recommended.

## 2024-05-03 ENCOUNTER — HOSPITAL ENCOUNTER (EMERGENCY)
Facility: HOSPITAL | Age: 49
Discharge: HOME | End: 2024-05-03
Payer: COMMERCIAL

## 2024-05-03 VITALS
SYSTOLIC BLOOD PRESSURE: 144 MMHG | WEIGHT: 202.8 LBS | BODY MASS INDEX: 30.04 KG/M2 | OXYGEN SATURATION: 98 % | HEART RATE: 83 BPM | HEIGHT: 69 IN | TEMPERATURE: 99.3 F | DIASTOLIC BLOOD PRESSURE: 76 MMHG | RESPIRATION RATE: 16 BRPM

## 2024-05-03 DIAGNOSIS — J02.0 STREP PHARYNGITIS: ICD-10-CM

## 2024-05-03 DIAGNOSIS — J11.1 INFLUENZA-LIKE ILLNESS: ICD-10-CM

## 2024-05-03 LAB
FLUAV RNA SPEC QL NAA+PROBE: NEGATIVE
FLUBV RNA RESP QL NAA+PROBE: NEGATIVE
GROUP A STREP BY PCR: DETECTED
RSV RNA SPEC NAA+PROBE: NEGATIVE
SARS-COV-2 RNA RESP QL NAA+PROBE: NEGATIVE

## 2024-05-03 PROCEDURE — 87651 STREP A DNA AMP PROBE: CPT

## 2024-05-03 PROCEDURE — 87637 SARSCOV2&INF A&B&RSV AMP PRB: CPT

## 2024-05-03 PROCEDURE — 99213 OFFICE O/P EST LOW 20 MIN: CPT

## 2024-05-03 PROCEDURE — G0463 HOSPITAL OUTPT CLINIC VISIT: HCPCS

## 2024-05-03 RX ORDER — HYDROCODONE BITARTRATE AND ACETAMINOPHEN 5; 325 MG/1; MG/1
1-2 TABLET ORAL EVERY 6 HOURS PRN
COMMUNITY
Start: 2024-02-15 | End: 2024-06-13

## 2024-05-03 RX ORDER — IBUPROFEN 800 MG/1
800 TABLET, FILM COATED ORAL EVERY 6 HOURS PRN
COMMUNITY
Start: 2024-02-15

## 2024-05-03 RX ORDER — OXYCODONE HYDROCHLORIDE 5 MG/1
TABLET ORAL
COMMUNITY
Start: 2024-02-12 | End: 2024-06-13

## 2024-05-03 RX ORDER — TRAMADOL HYDROCHLORIDE 50 MG/1
TABLET ORAL
COMMUNITY
Start: 2024-02-20 | End: 2024-06-13

## 2024-05-03 RX ORDER — CEPHALEXIN 500 MG/1
500 CAPSULE ORAL 2 TIMES DAILY
Qty: 20 CAPSULE | Refills: 0 | Status: SHIPPED | OUTPATIENT
Start: 2024-05-03 | End: 2024-05-13

## 2024-05-03 ASSESSMENT — ENCOUNTER SYMPTOMS
COUGH: 0
HEADACHES: 1
CHILLS: 1
APPETITE CHANGE: 1
SORE THROAT: 1
SHORTNESS OF BREATH: 0
ACTIVITY CHANGE: 0
NAUSEA: 0
VOMITING: 0
TROUBLE SWALLOWING: 0
DIARRHEA: 0
ABDOMINAL PAIN: 0
FEVER: 1
MYALGIAS: 1

## 2024-05-03 ASSESSMENT — ACTIVITIES OF DAILY LIVING (ADL): ADLS_ACUITY_SCORE: 35

## 2024-05-03 NOTE — DISCHARGE INSTRUCTIONS
Push fluids. Tylenol and ibuprofen as needed.   Cool liquids, honey, and salt water gargles to sooth throat.   Return with any chest pain, shortness of breath, uncontrolled fevers, or other concerns.   Follow up in the clinic as needed.

## 2024-05-03 NOTE — ED PROVIDER NOTES
History     Chief Complaint   Patient presents with    Fever    Generalized Body Aches     HPI  Dick Valdovinos is a 48 year old male who presents to the urgent care with complaints of a headache, sore throat, fevers, ear pain, and body aches that started yesterday. He denies cough, chest pain, shortness of breath, abd pain, and n/v/d. Has been drinking fluids. Daily smoker. Hx of asthma. Ibuprofen PTA.     Allergies:  Allergies   Allergen Reactions    Bactrim [Sulfamethoxazole-Trimethoprim] Hives    Codeine Hives and GI Disturbance    Ibuprofen Other (See Comments)     GI Upset    Metal [Staples] Hives    Zithromax [Azithromycin] Hives       Problem List:    Patient Active Problem List    Diagnosis Date Noted    Methamphetamine addiction (H) 12/12/2018     Priority: Medium     Currently in voluntary treatment      Gastroesophageal reflux disease with esophagitis 12/12/2018     Priority: Medium    Severe episode of recurrent major depressive disorder, without psychotic features (H) 12/12/2018     Priority: Medium        Past Medical History:    Past Medical History:   Diagnosis Date    Anxiety     Bipolar disorder (H)     Depressive disorder     PTSD (post-traumatic stress disorder) 2013    Uncomplicated asthma        Past Surgical History:    Past Surgical History:   Procedure Laterality Date    ARTHROSCOPY SHOULDER ROTATOR CUFF REPAIR Left 4/17/2018    Procedure: ARTHROSCOPY SHOULDER ROTATOR CUFF REPAIR;  LEFT SHOULDER ARTHROSCOPY, ROTATOR CUFF REPAIR;  Surgeon: Amanuel Matthews MD;  Location: [facility]    EYE SURGERY      ORTHOPEDIC SURGERY         Family History:    Family History   Problem Relation Age of Onset    Emphysema Mother     Alcoholism Mother     Alcoholism Brother     Anxiety Disorder Sister     Post-Traumatic Stress Disorder (PTSD) Sister     Alcoholism Sister        Social History:  Marital Status:   [2]  Social History     Tobacco Use    Smoking status: Every Day     Current packs/day: 0.50     " Average packs/day: 0.5 packs/day for 30.0 years (15.0 ttl pk-yrs)     Types: Cigarettes    Smokeless tobacco: Never   Substance Use Topics    Alcohol use: Not Currently     Comment: occasional use     Drug use: Yes     Frequency: 5.0 times per week     Types: Marijuana, IV     Comment: no meth in years        Medications:    cephALEXin (KEFLEX) 500 MG capsule  HYDROcodone-acetaminophen (NORCO) 5-325 MG tablet  ibuprofen (ADVIL/MOTRIN) 800 MG tablet  oxyCODONE (ROXICODONE) 5 MG tablet  traMADol (ULTRAM) 50 MG tablet  VENTOLIN  (90 Base) MCG/ACT inhaler          Review of Systems   Constitutional:  Positive for appetite change, chills and fever. Negative for activity change.   HENT:  Positive for congestion, ear pain and sore throat. Negative for trouble swallowing.    Respiratory:  Negative for cough and shortness of breath.    Cardiovascular:  Negative for chest pain.   Gastrointestinal:  Negative for abdominal pain, diarrhea, nausea and vomiting.   Musculoskeletal:  Positive for myalgias.   Neurological:  Positive for headaches.   All other systems reviewed and are negative.      Physical Exam   BP: 144/76  Pulse: 83  Temp: 99.3  F (37.4  C)  Resp: 16  Height: 175.3 cm (5' 9\")  Weight: 92 kg (202 lb 12.8 oz)  SpO2: 98 %      Physical Exam  Vitals and nursing note reviewed.   Constitutional:       General: He is not in acute distress.     Appearance: Normal appearance. He is ill-appearing. He is not toxic-appearing or diaphoretic.   HENT:      Head:      Jaw: No trismus.      Right Ear: Tympanic membrane is not erythematous.      Left Ear: Tympanic membrane is not erythematous.      Nose: Congestion present.      Mouth/Throat:      Mouth: Mucous membranes are moist.      Pharynx: Oropharynx is clear. Uvula midline. Posterior oropharyngeal erythema present. No oropharyngeal exudate or uvula swelling.      Tonsils: No tonsillar exudate or tonsillar abscesses. 1+ on the right. 1+ on the left.   Cardiovascular: "      Rate and Rhythm: Normal rate and regular rhythm.      Pulses: Normal pulses.      Heart sounds: Normal heart sounds. No murmur heard.  Pulmonary:      Effort: Pulmonary effort is normal.      Breath sounds: Normal breath sounds. No wheezing, rhonchi or rales.   Neurological:      Mental Status: He is alert.         ED Course        Procedures         Results for orders placed or performed during the hospital encounter of 05/03/24 (from the past 24 hour(s))   Group A Streptococcus PCR Throat Swab    Specimen: Throat; Swab   Result Value Ref Range    Group A strep by PCR Detected (A) Not Detected    Narrative    The Xpert Xpress Strep A test, performed on the [employer], is a rapid, qualitative in vitro diagnostic test for the detection of Streptococcus pyogenes (Group A ß-hemolytic Streptococcus, Strep A) in throat swab specimens from patients with signs and symptoms of pharyngitis. The Xpert Xpress Strep A test can be used as an aid in the diagnosis of Group A Streptococcal pharyngitis. The assay is not intended to monitor treatment for Group A Streptococcus infections. The Xpert Xpress Strep A test utilizes an automated real-time polymerase chain reaction (PCR) to detect Streptococcus pyogenes DNA.       Medications - No data to display    Assessments & Plan (with Medical Decision Making)     I have reviewed the nursing notes.    I have reviewed the findings, diagnosis, plan and need for follow up with the patient.  Dick Valdovinos is a 48 year old male who presents to the urgent care with complaints of a headache, sore throat, fevers, ear pain, and body aches that started yesterday. He denies cough, chest pain, shortness of breath, abd pain, and n/v/d. Has been drinking fluids. Daily smoker. Hx of asthma. Ibuprofen PTA.     MDM: vital signs normal, afebrile. Ill appearing but non toxic in appearance. Lungs clear, heart tones regular. Tonsils 1+ and equal. Uvula midline. No trismus,  drooling, or visible peritonsillar abscess. Strep positive. Cephalexin prescribed as he thinks he has a pcn allergy (hives), none listed. Covid multiplex pending. Would like to be called with results. Supportive measures and return precautions discussed. He is in agreement with plan.     (J11.1) Influenza-like illness  (J02.0) Strep pharyngitis  Plan: Push fluids. Tylenol and ibuprofen as needed.   Cool liquids, honey, and salt water gargles to sooth throat.   Return with any chest pain, shortness of breath, uncontrolled fevers, or other concerns.   Follow up in the clinic as needed. Understanding verbalized.       Discharge Medication List as of 5/3/2024 12:31 PM          Final diagnoses:   Influenza-like illness   Strep pharyngitis       5/3/2024   [facility]              Kathy Hartley NP  05/03/24 2420

## 2024-05-03 NOTE — ED TRIAGE NOTES
Pt presents with c/o a fever and body aches. Bilateral ear pain, headache, sore throat. Sx started yesterday. Denies known exposures. Pt took ibuprofen this morning.

## 2024-06-13 ENCOUNTER — HOSPITAL ENCOUNTER (EMERGENCY)
Facility: HOSPITAL | Age: 49
Discharge: HOME | End: 2024-06-13
Attending: NURSE PRACTITIONER | Admitting: NURSE PRACTITIONER
Payer: COMMERCIAL

## 2024-06-13 VITALS
HEIGHT: 70 IN | RESPIRATION RATE: 16 BRPM | HEART RATE: 73 BPM | TEMPERATURE: 97.4 F | DIASTOLIC BLOOD PRESSURE: 76 MMHG | WEIGHT: 200 LBS | BODY MASS INDEX: 28.63 KG/M2 | SYSTOLIC BLOOD PRESSURE: 142 MMHG

## 2024-06-13 DIAGNOSIS — H57.9 ITCHY EYES: ICD-10-CM

## 2024-06-13 DIAGNOSIS — H10.33 ACUTE CONJUNCTIVITIS OF BOTH EYES: Primary | ICD-10-CM

## 2024-06-13 PROCEDURE — 99213 OFFICE O/P EST LOW 20 MIN: CPT | Performed by: NURSE PRACTITIONER

## 2024-06-13 PROCEDURE — G0463 HOSPITAL OUTPT CLINIC VISIT: HCPCS

## 2024-06-13 RX ORDER — OLOPATADINE HYDROCHLORIDE 1 MG/ML
1 SOLUTION/ DROPS OPHTHALMIC 2 TIMES DAILY
Qty: 5 ML | Refills: 0 | Status: SHIPPED | OUTPATIENT
Start: 2024-06-13 | End: 2024-06-20

## 2024-06-13 RX ORDER — TOBRAMYCIN 3 MG/ML
1-2 SOLUTION/ DROPS OPHTHALMIC
Qty: 10 ML | Refills: 0 | Status: SHIPPED | OUTPATIENT
Start: 2024-06-13 | End: 2024-06-20

## 2024-06-13 ASSESSMENT — ENCOUNTER SYMPTOMS
PHOTOPHOBIA: 0
EYE ITCHING: 1
EYE DISCHARGE: 1
EYE REDNESS: 1

## 2024-06-13 ASSESSMENT — VISUAL ACUITY
OD: OTHER (SEE COMMENTS)
OS: 20/30
OU: 1

## 2024-06-13 NOTE — DISCHARGE INSTRUCTIONS
Stop taking  your daughter's eyedrops.  The itching could be because of the allergy you have to one of the ingredients in the drops.    Start using the antibiotic eyedrops as prescribed today as well as the olopatadine eyedrops which should help with the itching.  May take over-the-counter antihistamines as needed.    Follow-up with your primary doctor and eye doctor if no improvement in symptoms.    Return to urgent care or emergency room for any worsening or concerning symptoms.

## 2024-06-13 NOTE — ED PROVIDER NOTES
History     Chief Complaint   Patient presents with    Eye Problem     HPI  Dick Valdovinos is a 48 year old male who presents with his daughter for evaluation of bilateral eye redness. Symptoms started 1 week ago. He reports every morning eyes are crusted shut and he has blurry vision. Also notes his eyes are very itchy. His children were diagnosed with pink eye 1 week ago and he started using his daughter's eyedrops (polytrim). His eyes seem to be getting worse. No eye glass or contact lens use.     History of a left eye injury about 20 years ago.    Allergies:  Allergies   Allergen Reactions    Nickel Hives    Bactrim [Sulfamethoxazole-Trimethoprim] Hives    Codeine Hives and GI Disturbance    Ibuprofen Other (See Comments)     GI Upset    Metal [Staples] Hives    Zithromax [Azithromycin] Hives       Problem List:    Patient Active Problem List    Diagnosis Date Noted    Methamphetamine addiction (H) 12/12/2018     Priority: Medium     Currently in voluntary treatment      Gastroesophageal reflux disease with esophagitis 12/12/2018     Priority: Medium    Severe episode of recurrent major depressive disorder, without psychotic features (H) 12/12/2018     Priority: Medium        Past Medical History:    Past Medical History:   Diagnosis Date    Anxiety     Bipolar disorder (H)     Depressive disorder     PTSD (post-traumatic stress disorder) 2013    Uncomplicated asthma        Past Surgical History:    Past Surgical History:   Procedure Laterality Date    ARTHROSCOPY SHOULDER ROTATOR CUFF REPAIR Left 4/17/2018    Procedure: ARTHROSCOPY SHOULDER ROTATOR CUFF REPAIR;  LEFT SHOULDER ARTHROSCOPY, ROTATOR CUFF REPAIR;  Surgeon: Amanuel Matthews MD;  Location: [facility]    EYE SURGERY      ORTHOPEDIC SURGERY         Family History:    Family History   Problem Relation Age of Onset    Emphysema Mother     Alcoholism Mother     Alcoholism Brother     Anxiety Disorder Sister     Post-Traumatic Stress Disorder (PTSD) Sister   "   Alcoholism Sister        Social History:  Marital Status:   [2]  Social History     Tobacco Use    Smoking status: Every Day     Current packs/day: 0.50     Average packs/day: 0.5 packs/day for 30.0 years (15.0 ttl pk-yrs)     Types: Cigarettes    Smokeless tobacco: Never   Substance Use Topics    Alcohol use: Not Currently     Comment: occasional use     Drug use: Yes     Frequency: 5.0 times per week     Types: Marijuana, IV     Comment: no meth in years        Medications:    ibuprofen (ADVIL/MOTRIN) 800 MG tablet  olopatadine (PATANOL) 0.1 % ophthalmic solution  tobramycin (TOBREX) 0.3 % ophthalmic solution  VENTOLIN  (90 Base) MCG/ACT inhaler          Review of Systems   Eyes:  Positive for discharge, redness, itching and visual disturbance. Negative for photophobia.   All other systems reviewed and are negative.      Physical Exam   BP: 142/76  Pulse: 73  Temp: 97.4  F (36.3  C)  Resp: 16  Height: 177.8 cm (5' 10\")  Weight: 90.7 kg (200 lb)      Physical Exam  Vitals and nursing note reviewed.   Constitutional:       General: He is not in acute distress.     Appearance: He is well-developed. He is not diaphoretic.   HENT:      Head: Normocephalic and atraumatic.   Eyes:      General: Vision grossly intact. Gaze aligned appropriately. No visual field deficit.        Right eye: No discharge.         Left eye: No discharge.      Extraocular Movements: Extraocular movements intact.      Conjunctiva/sclera:      Right eye: Right conjunctiva is injected. No exudate.     Left eye: Left conjunctiva is injected. No exudate.     Pupils: Pupils are equal, round, and reactive to light.      Comments: Mild swelling to bilateral lower eyelids.    Cardiovascular:      Rate and Rhythm: Normal rate.   Pulmonary:      Effort: Pulmonary effort is normal. No respiratory distress.   Musculoskeletal:      Cervical back: Normal range of motion and neck supple.   Skin:     General: Skin is warm and dry.      " Coloration: Skin is not pale.   Neurological:      Mental Status: He is alert and oriented to person, place, and time.         ED Course        Procedures       No results found for this or any previous visit (from the past 24 hour(s)).    Medications - No data to display    Assessments & Plan (with Medical Decision Making)   Pleasant 48-year-old male that presented for evaluation of bilateral eye redness and discharge as well as itching.  Symptoms initially started a week ago.  His kids were recently diagnosed with pinkeye.  PERRLA.  EOMs intact.  Bilateral conjunctive are injected.  Mild swelling noted to bilateral lower eyelids with no skin erythema or bruising.  No discharge to both eyes appreciated at this time.  Reports crusty drainage every morning over the last few days.  Of note, patient has an allergy to Bactrim and he has been using Polytrim eyedrops for his symptoms.  Discussed this with patient.  Suspect possible allergic reaction to the Polytrim which is resulting in the significant itching that he is having to his eyes.    Patient was advised to stop using the Polytrim eyedrops.  Also cautioned against using someone else's eyedrops in the future.  Will treat with tobramycin for presumed bacterial conjunctivitis and Pataday eyedrops to help with the itching.  Recommended OTC antihistamines as needed.  Advised close follow-up with eye doctor or primary doctor if no improvement in symptoms.  Return to urgent care or emergency room for any worsening or concerning symptoms.    I have reviewed the nursing notes.    I have reviewed the findings, diagnosis, plan and need for follow up with the patient.  This document was prepared using a combination of typing and voice generated software.  While every attempt was made for accuracy, spelling and grammatical errors may exist.       New Prescriptions    OLOPATADINE (PATANOL) 0.1 % OPHTHALMIC SOLUTION    Place 1 drop into both eyes 2 times daily for 7 days     TOBRAMYCIN (TOBREX) 0.3 % OPHTHALMIC SOLUTION    Place 1-2 drops into both eyes every 4 hours (while awake) for 7 days       Final diagnoses:   Acute conjunctivitis of both eyes   Itchy eyes       6/13/2024   [facility]       Mpofu, Prudence, CNP  06/13/24 [id number]

## 2024-06-13 NOTE — ED TRIAGE NOTES
Patient presents to urgent care for bilateral eye problem that started a week ago. Patient states his kids have pink eye and he is wondering if that is what he has now.  Patient's symptoms are crusty in the morning, redness, watery, itchy and burning.  Patient has been using his daughters eye medications but doesn't seem to be helping.

## 2024-10-15 ENCOUNTER — APPOINTMENT (OUTPATIENT)
Dept: GENERAL RADIOLOGY | Facility: HOSPITAL | Age: 49
End: 2024-10-15
Attending: PHYSICIAN ASSISTANT
Payer: COMMERCIAL

## 2024-10-15 ENCOUNTER — HOSPITAL ENCOUNTER (EMERGENCY)
Facility: HOSPITAL | Age: 49
Discharge: HOME | End: 2024-10-15
Attending: PHYSICIAN ASSISTANT | Admitting: PHYSICIAN ASSISTANT
Payer: COMMERCIAL

## 2024-10-15 VITALS
RESPIRATION RATE: 18 BRPM | SYSTOLIC BLOOD PRESSURE: 131 MMHG | OXYGEN SATURATION: 99 % | TEMPERATURE: 97 F | DIASTOLIC BLOOD PRESSURE: 85 MMHG | HEART RATE: 60 BPM

## 2024-10-15 DIAGNOSIS — S49.92XA SHOULDER INJURY, LEFT, INITIAL ENCOUNTER: ICD-10-CM

## 2024-10-15 PROCEDURE — 99213 OFFICE O/P EST LOW 20 MIN: CPT | Performed by: PHYSICIAN ASSISTANT

## 2024-10-15 PROCEDURE — G0463 HOSPITAL OUTPT CLINIC VISIT: HCPCS

## 2024-10-15 PROCEDURE — 73030 X-RAY EXAM OF SHOULDER: CPT | Mod: LT

## 2024-10-15 ASSESSMENT — ACTIVITIES OF DAILY LIVING (ADL): ADLS_ACUITY_SCORE: 35

## 2024-10-15 NOTE — ED TRIAGE NOTES
SHELBY Link  assessed patient in triage and determined patient Urgent Care appropriate. Will be seen in Urgent Care.

## 2024-10-15 NOTE — ED PROVIDER NOTES
History     Chief Complaint   Patient presents with    Shoulder Pain     HPI  Dick Valdovinos is a 48 year old male who presents to urgent care for evaluation of left shoulder injury.  Patient states that he was on a stepstool and was falling and he reached out with his left arm to catch himself causing pain in his left shoulder.  Patient states that he did have a previous labral tear with surgery to this left shoulder.  Patient denies any numbness, or weakness to left upper extremity.  Patient states that his pain is worse with any type of movement of his left arm.    Allergies:  Allergies   Allergen Reactions    Nickel Hives    Bactrim [Sulfamethoxazole-Trimethoprim] Hives    Codeine Hives and GI Disturbance    Ibuprofen Other (See Comments)     GI Upset    Metal [Staples] Hives    Zithromax [Azithromycin] Hives       Problem List:    Patient Active Problem List    Diagnosis Date Noted    Methamphetamine addiction (H) 12/12/2018     Priority: Medium     Currently in voluntary treatment      Gastroesophageal reflux disease with esophagitis 12/12/2018     Priority: Medium    Severe episode of recurrent major depressive disorder, without psychotic features (H) 12/12/2018     Priority: Medium        Past Medical History:    Past Medical History:   Diagnosis Date    Anxiety     Bipolar disorder (H)     Depressive disorder     PTSD (post-traumatic stress disorder) 2013    Uncomplicated asthma        Past Surgical History:    Past Surgical History:   Procedure Laterality Date    ARTHROSCOPY SHOULDER ROTATOR CUFF REPAIR Left 4/17/2018    Procedure: ARTHROSCOPY SHOULDER ROTATOR CUFF REPAIR;  LEFT SHOULDER ARTHROSCOPY, ROTATOR CUFF REPAIR;  Surgeon: Amanuel Matthews MD;  Location: [facility]    EYE SURGERY      ORTHOPEDIC SURGERY         Family History:    Family History   Problem Relation Age of Onset    Emphysema Mother     Alcoholism Mother     Alcoholism Brother     Anxiety Disorder Sister     Post-Traumatic Stress Disorder  (PTSD) Sister     Alcoholism Sister        Social History:  Marital Status:   [2]  Social History     Tobacco Use    Smoking status: Every Day     Current packs/day: 0.50     Average packs/day: 0.5 packs/day for 30.0 years (15.0 ttl pk-yrs)     Types: Cigarettes    Smokeless tobacco: Never   Substance Use Topics    Alcohol use: Not Currently     Comment: occasional use     Drug use: Yes     Frequency: 5.0 times per week     Types: Marijuana, IV     Comment: no meth in years        Medications:    ibuprofen (ADVIL/MOTRIN) 800 MG tablet  VENTOLIN  (90 Base) MCG/ACT inhaler          Review of Systems   Musculoskeletal:         Left shoulder injury   All other systems reviewed and are negative.      Physical Exam   BP: 131/85  Pulse: 60  Temp: 97  F (36.1  C)  Resp: 18  SpO2: 99 %      Physical Exam  Vitals and nursing note reviewed.   Constitutional:       General: He is not in acute distress.     Appearance: Normal appearance. He is not ill-appearing or toxic-appearing.   Cardiovascular:      Rate and Rhythm: Regular rhythm.      Heart sounds: Normal heart sounds.   Pulmonary:      Breath sounds: Normal breath sounds.   Musculoskeletal:      Comments: Left shoulder: Patient has tenderness with palpation over proximal insertion of long head of bicep.  Patient also has tenderness with palpation over left scapula.  Decreased range of motion noted with abduction.  Normal strength, CMS intact.   Neurological:      Mental Status: He is alert.         ED Course        Procedures             Critical Care time:               Results for orders placed or performed during the hospital encounter of 10/15/24 (from the past 24 hour(s))   XR Shoulder Left 3 Views    Narrative    PROCEDURE:  XR SHOULDER LEFT G/E 3 VIEWS    HISTORY: fall.    COMPARISON:  2/22/2018    TECHNIQUE:  5 views left shoulder.    FINDINGS:  No fracture or dislocation is identified. Mild irregularity  of the greater tuberosity suggest chronic  degenerative change at the  cuff insertion. No foreign body is seen.       Impression    IMPRESSION: No acute fracture.      ALBARO HARRISON MD         SYSTEM ID:  [id number]       Medications - No data to display    Assessments & Plan (with Medical Decision Making)   #1.  Left shoulder injury    Discussed exam findings as well as x-ray result with patient.  No dislocation or acute fracture on x-ray today.  Patient was offered Toradol injection which she declines at this time.  We reviewed propria dosing of Tylenol and ibuprofen for pain control as well as RICE.  Patient states that he is already made an appointment to follow-up with Dr. Matthews for this specific injury and declines referral at this time.  Any additional concerns patient can return to urgent care or follow-up with primary care provider.  Patient verbalized understanding and agreement to plan.    I have reviewed the nursing notes.    I have reviewed the findings, diagnosis, plan and need for follow up with the patient.                New Prescriptions    No medications on file       Final diagnoses:   Shoulder injury, left, initial encounter       10/15/2024   [facility]       Darrell Hare PA-C  10/15/24 1010

## 2025-04-09 ENCOUNTER — HOSPITAL ENCOUNTER (EMERGENCY)
Facility: HOSPITAL | Age: 50
Discharge: HOME | End: 2025-04-09
Payer: COMMERCIAL

## 2025-04-09 VITALS
OXYGEN SATURATION: 98 % | HEIGHT: 69 IN | WEIGHT: 188 LBS | RESPIRATION RATE: 18 BRPM | TEMPERATURE: 97.1 F | BODY MASS INDEX: 27.85 KG/M2 | SYSTOLIC BLOOD PRESSURE: 115 MMHG | DIASTOLIC BLOOD PRESSURE: 75 MMHG | HEART RATE: 67 BPM

## 2025-04-09 DIAGNOSIS — J32.9 SINUSITIS: ICD-10-CM

## 2025-04-09 DIAGNOSIS — J20.9 ACUTE BRONCHITIS: ICD-10-CM

## 2025-04-09 LAB
FLUAV RNA SPEC QL NAA+PROBE: NEGATIVE
FLUBV RNA RESP QL NAA+PROBE: NEGATIVE
RSV RNA SPEC NAA+PROBE: NEGATIVE
SARS-COV-2 RNA RESP QL NAA+PROBE: NEGATIVE

## 2025-04-09 PROCEDURE — 99213 OFFICE O/P EST LOW 20 MIN: CPT

## 2025-04-09 PROCEDURE — G0463 HOSPITAL OUTPT CLINIC VISIT: HCPCS

## 2025-04-09 PROCEDURE — 87637 SARSCOV2&INF A&B&RSV AMP PRB: CPT

## 2025-04-09 RX ORDER — ARIPIPRAZOLE 5 MG/1
TABLET ORAL
COMMUNITY
Start: 2025-04-09

## 2025-04-09 RX ORDER — PREDNISONE 20 MG/1
TABLET ORAL
Qty: 10 TABLET | Refills: 0 | Status: SHIPPED | OUTPATIENT
Start: 2025-04-09

## 2025-04-09 RX ORDER — METHYLPHENIDATE HYDROCHLORIDE 10 MG/1
TABLET ORAL
COMMUNITY
Start: 2025-04-09

## 2025-04-09 ASSESSMENT — ENCOUNTER SYMPTOMS
SHORTNESS OF BREATH: 0
COUGH: 1
VOMITING: 1
SINUS PAIN: 1
ACTIVITY CHANGE: 0
APPETITE CHANGE: 0
SORE THROAT: 0
NAUSEA: 0
FEVER: 1
SINUS PRESSURE: 1
DIARRHEA: 0

## 2025-04-09 ASSESSMENT — ACTIVITIES OF DAILY LIVING (ADL): ADLS_ACUITY_SCORE: 41

## 2025-04-09 NOTE — ED TRIAGE NOTES
Pt presents with cough and congestion x1 week. Denied fever, ear or throat pain. Pt has vomited form coughing and has had diarrhea. PT has been taking ibuprofen and mucinex.

## 2025-04-09 NOTE — ED PROVIDER NOTES
History     Chief Complaint   Patient presents with    Cough     HPI  Dick Valdovinos is a 49 year old male who presents to the urgent care with a one week history of cough and congestion. Has had some post tussive emesis due to sputum. And has felt fevers. No chest pressure. Staying hydrated. Daily smoker. No hx of asthma. No recent abx or OTC medications.     Allergies:  Allergies   Allergen Reactions    Nickel Hives    Bactrim [Sulfamethoxazole-Trimethoprim] Hives    Codeine Hives and GI Disturbance    Ibuprofen Other (See Comments)     GI Upset    Metal [Staples] Hives    Zithromax [Azithromycin] Hives       Problem List:    Patient Active Problem List    Diagnosis Date Noted    Methamphetamine addiction (H) 12/12/2018     Priority: Medium     Currently in voluntary treatment      Gastroesophageal reflux disease with esophagitis 12/12/2018     Priority: Medium    Severe episode of recurrent major depressive disorder, without psychotic features (H) 12/12/2018     Priority: Medium        Past Medical History:    Past Medical History:   Diagnosis Date    Anxiety     Bipolar disorder (H)     Depressive disorder     PTSD (post-traumatic stress disorder) 2013    Uncomplicated asthma        Past Surgical History:    Past Surgical History:   Procedure Laterality Date    ARTHROSCOPY SHOULDER ROTATOR CUFF REPAIR Left 4/17/2018    Procedure: ARTHROSCOPY SHOULDER ROTATOR CUFF REPAIR;  LEFT SHOULDER ARTHROSCOPY, ROTATOR CUFF REPAIR;  Surgeon: Amanuel Matthews MD;  Location: [facility]    EYE SURGERY      ORTHOPEDIC SURGERY         Family History:    Family History   Problem Relation Age of Onset    Emphysema Mother     Alcoholism Mother     Alcoholism Brother     Anxiety Disorder Sister     Post-Traumatic Stress Disorder (PTSD) Sister     Alcoholism Sister        Social History:  Marital Status:   [2]  Social History     Tobacco Use    Smoking status: Every Day     Current packs/day: 0.50     Average packs/day: 0.5  "packs/day for 30.0 years (15.0 ttl pk-yrs)     Types: Cigarettes    Smokeless tobacco: Never   Substance Use Topics    Alcohol use: Not Currently     Comment: occasional use     Drug use: Yes     Frequency: 5.0 times per week     Types: Marijuana, IV     Comment: no meth in years        Medications:    amoxicillin-clavulanate (AUGMENTIN) 875-125 MG tablet  ARIPiprazole (ABILIFY) 5 MG tablet  methylphenidate (RITALIN) 10 MG tablet  predniSONE (DELTASONE) 20 MG tablet  VENTOLIN  (90 Base) MCG/ACT inhaler  ibuprofen (ADVIL/MOTRIN) 800 MG tablet          Review of Systems   Constitutional:  Positive for fever. Negative for activity change and appetite change.   HENT:  Positive for congestion, sinus pressure and sinus pain. Negative for ear pain and sore throat.    Respiratory:  Positive for cough. Negative for shortness of breath.    Gastrointestinal:  Positive for vomiting (post tussive). Negative for diarrhea and nausea.   All other systems reviewed and are negative.      Physical Exam   BP: 115/75  Pulse: 67  Temp: 97.1  F (36.2  C)  Resp: 18  Height: 175.3 cm (5' 9\")  Weight: 85.3 kg (188 lb)  SpO2: 98 %      Physical Exam  Vitals and nursing note reviewed.   Constitutional:       General: He is not in acute distress.     Appearance: Normal appearance. He is not ill-appearing or toxic-appearing.   HENT:      Head:      Jaw: No trismus.      Right Ear: Tympanic membrane is bulging. Tympanic membrane is not erythematous.      Left Ear: Tympanic membrane is bulging. Tympanic membrane is not erythematous.      Nose: Congestion present.      Right Sinus: Frontal sinus tenderness present. No maxillary sinus tenderness.      Left Sinus: Frontal sinus tenderness present. No maxillary sinus tenderness.      Mouth/Throat:      Mouth: Mucous membranes are moist.      Pharynx: Oropharynx is clear. Uvula midline. No oropharyngeal exudate or posterior oropharyngeal erythema.   Cardiovascular:      Rate and Rhythm: Normal " rate and regular rhythm.      Heart sounds: Normal heart sounds. No murmur heard.  Pulmonary:      Effort: Pulmonary effort is normal.      Breath sounds: Wheezing (mild) present. No rhonchi or rales.   Neurological:      Mental Status: He is alert.         ED Course        Procedures         No results found for this or any previous visit (from the past 24 hours).    Medications - No data to display    Assessments & Plan (with Medical Decision Making)     I have reviewed the nursing notes.    I have reviewed the findings, diagnosis, plan and need for follow up with the patient.  Dick Valdovinos is a 49 year old male who presents to the urgent care with a one week history of cough and congestion. Has had some post tussive emesis due to sputum. And has felt fevers. No chest pressure. Staying hydrated. Daily smoker. No hx of asthma. No recent abx or OTC medications.     MDM: vital signs normal, afebrile. Non toxic in appearance with no note distress. Lungs with very mild wheeze. Heart tones regular. Able to speak in complete sentences without dyspnea. Mild frontal sinus tenderness. Given length of symptoms, concern for early development of bacterial sinusitis. Prednisone burst and augmentin prescribed. Encouraged close follow up in clinic. Supportive measures and return precautions discussed. He is in agreement with plan.     (J32.9) Sinusitis, (J20.9) Acute bronchitis  Plan: Augmentin 2 times daily for 10 days. Yogurt or probiotic while taking. Eat with this medication.   Prednisone once daily for 5 days. Avoid ibuprofen with this medication.   Push fluids.   Follow up in the clinic as needed.   Return with any new or concerning symptoms. Understanding verbalized.     New Prescriptions    AMOXICILLIN-CLAVULANATE (AUGMENTIN) 875-125 MG TABLET    Take 1 tablet by mouth 2 times daily for 10 days.    PREDNISONE (DELTASONE) 20 MG TABLET    Take two tablets (= 40mg) each day for 5 (five) days       Final diagnoses:    Sinusitis   Acute bronchitis       4/9/2025   [facility]       Kathy Hartley NP  04/09/25 [id number]

## 2025-04-09 NOTE — DISCHARGE INSTRUCTIONS
Augmentin 2 times daily for 10 days. Yogurt or probiotic while taking. Eat with this medication.   Prednisone once daily for 5 days. Avoid ibuprofen with this medication.   Push fluids.   Follow up in the clinic as needed.   Return with any new or concerning symptoms.

## 2025-04-11 NOTE — PROGRESS NOTES
Infection Prevention Progress Note  4/11/2025      Patient Name: Dick Valdovinos [id number]  Admit Date: 4/9/2025    Infection Status as of 4/11/2025 8:29 AM: No active infections  Isolation Status as of 4/11/2025 8:29 AM: No active isolations     MDRO Discontinuation  Infection Prevention has reviewed this patient's chart per the MDRO D/C Policy and have taken the following action:    Patient meets all the criteria for discontinuation and Infection Prevention will resolve the MRSA infection status.    If you have any questions, please contact Infection Prevention.    GIOVANNY SHAIKH, Infection Prevention

## (undated) DEVICE — NDL-SPINAL 18G X 3.5" QUINCKE

## (undated) DEVICE — TUBING-ARTHROSCOPY-INFLOW

## (undated) DEVICE — CAUTERY PAD-POLYHESIVE II ADULT

## (undated) DEVICE — SLING-SUPER LARGE

## (undated) DEVICE — SCD SLEEVE-KNEE REG.

## (undated) DEVICE — PUNCH FOR SWIVELOCK

## (undated) DEVICE — IRRIGATION-NACL 3000ML (BAG)

## (undated) DEVICE — Device

## (undated) DEVICE — PACK-SHOULDER ARTHROSCOPY-CUSTOM

## (undated) DEVICE — WAND-APOLLO RF 90 MULTIPORT

## (undated) DEVICE — BIN-ARTHROSCOPY CART

## (undated) DEVICE — APPLICATOR-CHLORAPREP 26ML TINTED CHG 2%+ 70% IPA-SURGICAL

## (undated) DEVICE — BLADE-DISSECTOR AR-8400DS

## (undated) DEVICE — CANISTER-SUCTION 2000CC

## (undated) DEVICE — UNIVERSAL HEAD POSITIONER-BEACH CHAIR

## (undated) DEVICE — BLANKET-BAIR LOWER EXTREMITY

## (undated) DEVICE — BLADE-FLUSH CUT OVAL BURR AR-8550FOE

## (undated) DEVICE — DRSG-KERLIX ROLL 4.5 X 4.1YD

## (undated) DEVICE — LIGHT HANDLE COVER

## (undated) DEVICE — SUTURE-VICRYL 3-0 SH J416H

## (undated) DEVICE — GOWN-SURG XL LVL 3 REINFORCED

## (undated) DEVICE — LABEL-STERILE PREPRINTED FOR OR

## (undated) DEVICE — NEEDLE-SCORPION MULTIFIRE

## (undated) DEVICE — IRRIGATION-NACL 1000ML

## (undated) DEVICE — IRRIGATION-H2O 1000ML

## (undated) DEVICE — TAPE-MEDIPORE 4" X 2YD

## (undated) DEVICE — NDL-BLUNT FILL 18G 1.5"

## (undated) DEVICE — LIMB HOLDER-QUILTED OR

## (undated) DEVICE — GLV-8.0 BIOGEL PF/LF BLUE INDICATOR UNDERGLOVE

## (undated) DEVICE — BIN-ROTATOR CUFF (SHOULDER)

## (undated) DEVICE — SUTURE-ETHILON 3-0 PS-1 1663H

## (undated) DEVICE — GLV-8.0 BIOGEL LATEX

## (undated) RX ORDER — ONDANSETRON 2 MG/ML
INJECTION INTRAMUSCULAR; INTRAVENOUS
Status: DISPENSED
Start: 2018-04-17

## (undated) RX ORDER — LIDOCAINE HYDROCHLORIDE 20 MG/ML
INJECTION, SOLUTION EPIDURAL; INFILTRATION; INTRACAUDAL; PERINEURAL
Status: DISPENSED
Start: 2018-04-17

## (undated) RX ORDER — FENTANYL CITRATE 50 UG/ML
INJECTION, SOLUTION INTRAMUSCULAR; INTRAVENOUS
Status: DISPENSED
Start: 2018-04-17

## (undated) RX ORDER — PHENYLEPHRINE HCL IN 0.9% NACL 1 MG/10 ML
SYRINGE (ML) INTRAVENOUS
Status: DISPENSED
Start: 2018-04-17

## (undated) RX ORDER — PROPOFOL 10 MG/ML
INJECTION, EMULSION INTRAVENOUS
Status: DISPENSED
Start: 2018-04-17

## (undated) RX ORDER — EPHEDRINE SULFATE 50 MG/ML
INJECTION, SOLUTION INTRAMUSCULAR; INTRAVENOUS; SUBCUTANEOUS
Status: DISPENSED
Start: 2018-04-17

## (undated) RX ORDER — DEXAMETHASONE SODIUM PHOSPHATE 10 MG/ML
INJECTION, SOLUTION INTRAMUSCULAR; INTRAVENOUS
Status: DISPENSED
Start: 2018-04-17